# Patient Record
Sex: MALE | Race: BLACK OR AFRICAN AMERICAN | NOT HISPANIC OR LATINO | Employment: PART TIME | URBAN - METROPOLITAN AREA
[De-identification: names, ages, dates, MRNs, and addresses within clinical notes are randomized per-mention and may not be internally consistent; named-entity substitution may affect disease eponyms.]

---

## 2022-06-29 ENCOUNTER — TELEPHONE (OUTPATIENT)
Dept: HEMATOLOGY/ONCOLOGY | Facility: CLINIC | Age: 32
End: 2022-06-29
Payer: MEDICAID

## 2022-06-29 DIAGNOSIS — D3A.8 PRIMARY PANCREATIC NEUROENDOCRINE TUMOR: Primary | ICD-10-CM

## 2022-06-29 NOTE — TELEPHONE ENCOUNTER
----- Message from Jackie Bobo RN sent at 6/28/2022  5:05 PM CDT -----  Regarding: FW: schedule- message states neuro tumor  Contact: [phone]    ----- Message -----  From: Yao Love RN  Sent: 6/28/2022   3:12 PM CDT  To: [facility] Cancer Navigation  Subject: FW: schedule- message states neuro tumor           ----- Message -----  From: Vickie Nuno  Sent: 6/28/2022   3:11 PM CDT  To: , #  Subject: schedule                                         Request Appt      Appt Type: Neuro Tumor   Call Back Number:[phone]  Additional Information:

## 2022-06-29 NOTE — TELEPHONE ENCOUNTER
Called patient to discuss his desire for NP appt. Spoke to patient to get better understanding of care so far. All treatment for his neuroendocrine diagnosis has been at [facility]. Had surgery and has been on sandostatin injections for some time now with good response in symptoms. He had a negative experience yesterday at [facility] when he went for his injection and did not receive it. He is interested in transferring to [facility] asap to receive injection.     Explained that we would have to have insurance approve the injection before we can proceed, but that I can send Dr. Arellano a message to see if she can review his chart and possibly place the order in advance of a visit. Holding 7/7 appt time for patient currently at [place] location with Dr. Arellano while I wait for her response    Provided my direct number to patient, he verbalized understanding of plan of care.

## 2022-06-30 NOTE — PROGRESS NOTES
CC:  Metastatic Pancreatic neuroendocrine tumor. Grade 2 Well differentiated. Mitoses <2; Ki-67 3%; pT4, N0, M1c    HPI:  Mr. Franco is a 31M with VHL and metastatic PNET s/p distal pancreatectomy, splenectomy, left adrenalectomy, RP LN dissection, liver biopsy and cholecystectomy on 3/29/2022 by Dr Delatorre at [facility]. He was readmitted on 4/18/22 with MSSA bacteremia and discharged with home infusion of cefazolin to complete a 6 week course. He is on sandostatin monthly injections. Was previously receiving care at [facility] but wants to transfer care to us. Due for repeat imaging in July and MRI brain/ear and spine as part of the screening protocol for VHL.     Patient initially presented with abdominal pain, diarrhea and flushing since March 2021.    CT A/P 2/7/22:  Innumerable peripherally enhancing hypoattenuating lesions throughout the liver, new from the CT abdomen April 12, 2016. Additional heterogeneous mass in the left upper quadrant appears to arise from the pancreatic tail, which may be the primary source. There is loss of differentiation with the ipsilateral adrenal gland and loss of fat plane with the anterior upper pole of the left kidney. A separate 1.3 cm hyperenhancing focus is noted in the pancreatic head. Small amount of ascites present.     Biopsy of the pancreas 2/9/22:  Cytomorphology and immunochemistry is that of a pancreatic neuroendocrine tumor, well differentiated in this material and associated with fibrous tissue fragments. See note.        Note: Tumor cells are positive for CAM5.2 and diffusely for synaptophysin with appropriate controls. Ki-67 is pending for completion, and an addendum will follow; however, the liver findings on imaging indicative of probable metastatic spread are noted. Recommend clinical correlation.     He was started on sandostatin LAR 30 mg at the end of Feb 2022 which significantly helped with his symptoms.     3/8/22: chromogranin A 32.5, 5-HIAA 10    Copper PET  "3/8/22:  "The liver is enlarged with innumerable ill-defined hypoattenuating lesions seen throughout, the largest of which measures up to 4.6 cm and demonstrate increased radiotracer uptake. These lesions are compatible with metastatic disease and demonstrate a Krenning score of 4.     There is a loss of fat plane at the anterior left kidney upper pole with an ill-defined heterogeneous mass that demonstrates radiotracer uptake in the area of the left adrenal gland/pancreatic tail. These findings are compatible with fine-needle aspiration findings of pancreatic neuroendocrine tumor with likely metastatic disease to the left adrenal gland; Krenning score of 4.     The bilateral axillary lymph nodes are not enlarged by size criteria but demonstrate slight uptake of radiotracer, although less than that of the liver; Krenning score of 1.     Interval development of ascites adjacent to the inferior aspect of the liver, in the right paracolic gutter and in the pelvis.     No abnormally enlarged or radioisotope avid mediastinal, hilar, intra-abdominal, intrapelvic or inguinal lymph nodes nodes are noted to suggest metastatic disease."     He underwent distal pancreatectomy, splenectomy, left adrenalectomy, RP LN dissection, liver biopsy and cholecystectomy by Dr Delatorre on 3/29/2022.    Surgical pathology 3/29/2022:  1. Right hepatic lobe, mass biopsy:  - Metastatic well differentiated neuroendocrine tumor.      2. Gallbladder, cholecystectomy:  - Polypoidal well-differentiated neuroendocrine tumor, 0.5 cm . Cystic duct margin and serosal surface negative for tumor.      3. Pancreas, spleen and left adrenal gland, partial pancreatectomy, splenectomy, and adrenalectomy:  - Well-differentiated neuroendocrine tumor, 5.8 cm, grade 2.  Ki 67 3%. Neoplasm extends through fibroconnective tissue into the adherent adrenal gland, it abuts the spleen without involvement.  Perineural invasion identified.  Anterior and posterior margins " "positive for tumor.  Pancreatic resection margin negative ( by 3.8 mm from the tumor).  Twelve lymph nodes, negative for metastasis. (0/12). Please see comment and synoptic report     4. Soft tissue, retroperitoneum, resection:  - Well-differentiated neuroendocrine tumor extending into fibroadipose tissue and adrenal gland     5. Left adrenal gland, remainder, resection:  - Benign residual adrenal gland, and fibroadipose tissue.     6. Left hepatic lobe, mass, biopsy:  - Metastatic well-differentiated neuroendocrine tumor      MRI A 22:  "Unchanged appearance of metastatic hepatic tumor burden with target lesions as above. Interval development of a large volume of ascites. Stable 3-4 cm perianastomotic collection along the pancreatic resection margin."    Patient presents today to transfer his care. Was supposed to get sandostatin last week but didn't take it due to chaos at the infusion center. Was on oxycodone 4 a day after surgery. Main pain is in epigastric/LUQ with left back/shoulder muscle spasm from surgery. Also has pain in RUQ from cancer. Noted diarrhea, SOB, weakness. But overall slowly improving. Moving to [facility] in October.     ECO     PMH:  VHL  PNET     PSH:  distal pancreatectomy, splenectomy, left adrenalectomy, RP LN dissection, liver biopsy and cholecystectomy on 3/29/2022     Family history:  No family history of cancer    Review of Systems   Constitutional: Positive for fatigue and unexpected weight change. Negative for appetite change, chills and fever.   HENT: Negative for mouth sores, nosebleeds, tinnitus, trouble swallowing and voice change.    Eyes: Negative for pain, redness and visual disturbance.   Respiratory: Positive for shortness of breath. Negative for cough and wheezing.    Cardiovascular: Negative for chest pain, palpitations and leg swelling.   Gastrointestinal: Positive for abdominal pain and diarrhea. Negative for abdominal distention, blood in stool, " constipation, nausea and vomiting.   Endocrine: Negative for polydipsia, polyphagia and polyuria.   Genitourinary: Negative for flank pain, frequency and hematuria.   Musculoskeletal: Positive for back pain. Negative for arthralgias, gait problem, joint swelling, myalgias, neck pain and neck stiffness.   Skin: Negative for color change, pallor, rash and wound.   Neurological: Positive for weakness. Negative for tremors, seizures, syncope, speech difficulty, light-headedness, numbness and headaches.   Hematological: Negative for adenopathy. Does not bruise/bleed easily.   Psychiatric/Behavioral: Negative for confusion, dysphoric mood and self-injury. The patient is not nervous/anxious.    All other systems reviewed and are negative.      Objective:  Physical Exam  Vitals reviewed.   Constitutional:       General: He is not in acute distress.     Appearance: He is well-developed.   HENT:      Head: Normocephalic and atraumatic.      Mouth/Throat:      Pharynx: No oropharyngeal exudate.   Eyes:      General: No scleral icterus.     Conjunctiva/sclera: Conjunctivae normal.      Pupils: Pupils are equal, round, and reactive to light.   Neck:      Thyroid: No thyromegaly.      Vascular: No JVD.   Cardiovascular:      Rate and Rhythm: Normal rate and regular rhythm.      Heart sounds: Normal heart sounds. No murmur heard.    No friction rub. No gallop.   Pulmonary:      Effort: Pulmonary effort is normal. No respiratory distress.      Breath sounds: Normal breath sounds. No wheezing or rales.   Chest:   Breasts:      Right: No supraclavicular adenopathy.      Left: No supraclavicular adenopathy.       Abdominal:      General: Bowel sounds are normal. There is no distension.      Palpations: Abdomen is soft. There is no mass.      Tenderness: There is abdominal tenderness. There is no rebound.      Hernia: No hernia is present.   Musculoskeletal:         General: No tenderness or deformity. Normal range of motion.       "Cervical back: Normal range of motion and neck supple.   Lymphadenopathy:      Cervical: No cervical adenopathy.      Upper Body:      Right upper body: No supraclavicular adenopathy.      Left upper body: No supraclavicular adenopathy.   Skin:     General: Skin is warm and dry.      Coloration: Skin is not pale.      Findings: No erythema or rash.   Neurological:      Mental Status: He is alert and oriented to person, place, and time.      Cranial Nerves: No cranial nerve deficit.      Motor: No abnormal muscle tone.   Psychiatric:         Behavior: Behavior normal.         Thought Content: Thought content normal.         Judgment: Judgment normal.         Labs:  3/8/22: chromogranin A 32.5, 5-HIAA 10    Imaging Data:  Copper PET 3/8/22:  "The liver is enlarged with innumerable ill-defined hypoattenuating lesions seen throughout, the largest of which measures up to 4.6 cm and demonstrate increased radiotracer uptake. These lesions are compatible with metastatic disease and demonstrate a Krenning score of 4.     There is a loss of fat plane at the anterior left kidney upper pole with an ill-defined heterogeneous mass that demonstrates radiotracer uptake in the area of the left adrenal gland/pancreatic tail. These findings are compatible with fine-needle aspiration findings of pancreatic neuroendocrine tumor with likely metastatic disease to the left adrenal gland; Krenning score of 4.     The bilateral axillary lymph nodes are not enlarged by size criteria but demonstrate slight uptake of radiotracer, although less than that of the liver; Krenning score of 1.     Interval development of ascites adjacent to the inferior aspect of the liver, in the right paracolic gutter and in the pelvis.     No abnormally enlarged or radioisotope avid mediastinal, hilar, intra-abdominal, intrapelvic or inguinal lymph nodes nodes are noted to suggest metastatic disease."     MRI A 5/20/22:  "Unchanged appearance of metastatic hepatic " "tumor burden with target lesions as above. Interval development of a large volume of ascites. Stable 3-4 cm perianastomotic collection along the pancreatic resection margin."      Assessment and plan:    1. Neuroendocrine tumor of pancreas    2. Secondary malignant neoplasm of liver    3. Immunodeficiency secondary to neoplasm    4. Cancer-related pain    5. VHL (von Hippel-Lindau syndrome)    6. Muscle spasm    7. History of pancreatectomy      1-3  - Mr Franco is an 30 yo man with metastatic well differentiated, grade 2 pancreatic NET, with metastases to liver, intra-abdominal soft tissue, gallbladder, s/p distal pancreatectomy, splenectomy, left adrenalectomy, RP LN dissection, liver biopsy and cholecystectomy on 3/29/2022 by Dr Delatorre at [facility]  - has been on sandostatin 30 mg q4w since the end of Feb 2022. Significant symptom improvement with sandostatin. Continue every 4 weeks. Will give today  - monitor labs  - will schedule for restaging copper PET in 2 months. Will review at the NET tumor board    4.  - refilled oxycodone prn  - refer to palliative medicine for pain management    5.  - was diagnosed by blood test at [facility] after he was diagnosed with NET  - refer to genetics    6.  - from surgery  - try baclofen    7.  - glucometer/strip/lancet prescribed    Return in 4 weeks  Encouraged to call should issues arise.   I spent 70 minutes reviewing the chart, interpreting laboratory result and imaging data, coordinating patient's care, with at least 50% of the time on face-to-face counseling.       "

## 2022-07-07 ENCOUNTER — INFUSION (OUTPATIENT)
Dept: INFUSION THERAPY | Facility: HOSPITAL | Age: 32
End: 2022-07-07
Attending: INTERNAL MEDICINE
Payer: MEDICAID

## 2022-07-07 ENCOUNTER — TELEPHONE (OUTPATIENT)
Dept: INFUSION THERAPY | Facility: HOSPITAL | Age: 32
End: 2022-07-07
Payer: MEDICAID

## 2022-07-07 ENCOUNTER — OFFICE VISIT (OUTPATIENT)
Dept: HEMATOLOGY/ONCOLOGY | Facility: CLINIC | Age: 32
End: 2022-07-07
Payer: MEDICAID

## 2022-07-07 VITALS
SYSTOLIC BLOOD PRESSURE: 141 MMHG | HEIGHT: 72 IN | RESPIRATION RATE: 16 BRPM | WEIGHT: 146.38 LBS | BODY MASS INDEX: 19.83 KG/M2 | OXYGEN SATURATION: 98 % | DIASTOLIC BLOOD PRESSURE: 90 MMHG | HEART RATE: 70 BPM

## 2022-07-07 VITALS — HEIGHT: 72 IN | WEIGHT: 146.38 LBS | BODY MASS INDEX: 19.83 KG/M2

## 2022-07-07 DIAGNOSIS — M62.838 MUSCLE SPASM: ICD-10-CM

## 2022-07-07 DIAGNOSIS — G89.3 CANCER-RELATED PAIN: ICD-10-CM

## 2022-07-07 DIAGNOSIS — D3A.8 PRIMARY PANCREATIC NEUROENDOCRINE TUMOR: Primary | ICD-10-CM

## 2022-07-07 DIAGNOSIS — Q85.83 VHL (VON HIPPEL-LINDAU SYNDROME): ICD-10-CM

## 2022-07-07 DIAGNOSIS — Z90.410 HISTORY OF PANCREATECTOMY: ICD-10-CM

## 2022-07-07 DIAGNOSIS — D3A.8 NEUROENDOCRINE TUMOR OF PANCREAS: Primary | ICD-10-CM

## 2022-07-07 DIAGNOSIS — F41.9 ANXIETY: ICD-10-CM

## 2022-07-07 DIAGNOSIS — D49.9 IMMUNODEFICIENCY SECONDARY TO NEOPLASM: ICD-10-CM

## 2022-07-07 DIAGNOSIS — C78.7 SECONDARY MALIGNANT NEOPLASM OF LIVER: ICD-10-CM

## 2022-07-07 DIAGNOSIS — D84.81 IMMUNODEFICIENCY SECONDARY TO NEOPLASM: ICD-10-CM

## 2022-07-07 PROCEDURE — 99213 OFFICE O/P EST LOW 20 MIN: CPT | Mod: PBBFAC,PO | Performed by: INTERNAL MEDICINE

## 2022-07-07 PROCEDURE — 1159F MED LIST DOCD IN RCRD: CPT | Mod: CPTII,,, | Performed by: INTERNAL MEDICINE

## 2022-07-07 PROCEDURE — 63600175 PHARM REV CODE 636 W HCPCS: Mod: JG | Performed by: INTERNAL MEDICINE

## 2022-07-07 PROCEDURE — 1160F RVW MEDS BY RX/DR IN RCRD: CPT | Mod: CPTII,,, | Performed by: INTERNAL MEDICINE

## 2022-07-07 PROCEDURE — 3008F PR BODY MASS INDEX (BMI) DOCUMENTED: ICD-10-PCS | Mod: CPTII,,, | Performed by: INTERNAL MEDICINE

## 2022-07-07 PROCEDURE — 99205 OFFICE O/P NEW HI 60 MIN: CPT | Mod: S$PBB,,, | Performed by: INTERNAL MEDICINE

## 2022-07-07 PROCEDURE — 3080F PR MOST RECENT DIASTOLIC BLOOD PRESSURE >= 90 MM HG: ICD-10-PCS | Mod: CPTII,,, | Performed by: INTERNAL MEDICINE

## 2022-07-07 PROCEDURE — 3077F SYST BP >= 140 MM HG: CPT | Mod: CPTII,,, | Performed by: INTERNAL MEDICINE

## 2022-07-07 PROCEDURE — 1160F PR REVIEW ALL MEDS BY PRESCRIBER/CLIN PHARMACIST DOCUMENTED: ICD-10-PCS | Mod: CPTII,,, | Performed by: INTERNAL MEDICINE

## 2022-07-07 PROCEDURE — 99999 PR PBB SHADOW E&M-EST. PATIENT-LVL III: CPT | Mod: PBBFAC,,, | Performed by: INTERNAL MEDICINE

## 2022-07-07 PROCEDURE — 3077F PR MOST RECENT SYSTOLIC BLOOD PRESSURE >= 140 MM HG: ICD-10-PCS | Mod: CPTII,,, | Performed by: INTERNAL MEDICINE

## 2022-07-07 PROCEDURE — 99205 PR OFFICE/OUTPT VISIT, NEW, LEVL V, 60-74 MIN: ICD-10-PCS | Mod: S$PBB,,, | Performed by: INTERNAL MEDICINE

## 2022-07-07 PROCEDURE — 99999 PR PBB SHADOW E&M-EST. PATIENT-LVL III: ICD-10-PCS | Mod: PBBFAC,,, | Performed by: INTERNAL MEDICINE

## 2022-07-07 PROCEDURE — 3080F DIAST BP >= 90 MM HG: CPT | Mod: CPTII,,, | Performed by: INTERNAL MEDICINE

## 2022-07-07 PROCEDURE — 3008F BODY MASS INDEX DOCD: CPT | Mod: CPTII,,, | Performed by: INTERNAL MEDICINE

## 2022-07-07 PROCEDURE — 96372 THER/PROPH/DIAG INJ SC/IM: CPT

## 2022-07-07 PROCEDURE — 1159F PR MEDICATION LIST DOCUMENTED IN MEDICAL RECORD: ICD-10-PCS | Mod: CPTII,,, | Performed by: INTERNAL MEDICINE

## 2022-07-07 RX ORDER — OXYCODONE HYDROCHLORIDE 5 MG/1
5 TABLET ORAL EVERY 6 HOURS PRN
Qty: 120 TABLET | Refills: 0 | Status: SHIPPED | OUTPATIENT
Start: 2022-07-07 | End: 2022-08-19

## 2022-07-07 RX ORDER — METHOCARBAMOL 500 MG/1
TABLET, FILM COATED ORAL 4 TIMES DAILY
COMMUNITY
End: 2022-10-05

## 2022-07-07 RX ORDER — CETIRIZINE HYDROCHLORIDE 10 MG/1
10 TABLET ORAL DAILY
COMMUNITY

## 2022-07-07 RX ORDER — LANCETS
1 EACH MISCELLANEOUS DAILY
Qty: 100 EACH | Refills: 3 | Status: SHIPPED | OUTPATIENT
Start: 2022-07-07

## 2022-07-07 RX ORDER — ALBUTEROL SULFATE 0.63 MG/3ML
0.63 SOLUTION RESPIRATORY (INHALATION) EVERY 6 HOURS PRN
COMMUNITY

## 2022-07-07 RX ORDER — BACLOFEN 10 MG/1
10 TABLET ORAL 3 TIMES DAILY PRN
Qty: 90 TABLET | Refills: 2 | Status: SHIPPED | OUTPATIENT
Start: 2022-07-07 | End: 2023-07-07

## 2022-07-07 RX ORDER — INSULIN PUMP SYRINGE, 3 ML
EACH MISCELLANEOUS
Qty: 1 EACH | Refills: 0 | Status: SHIPPED | OUTPATIENT
Start: 2022-07-07 | End: 2023-07-07

## 2022-07-07 RX ADMIN — OCTREOTIDE ACETATE 30 MG: KIT at 10:07

## 2022-07-07 NOTE — NURSING
Patient tolerated Sandostatin 30mg injection to left dorsalgluteal well, next appointment scheduled and pt d/c in no acute distress.

## 2022-07-08 ENCOUNTER — TELEPHONE (OUTPATIENT)
Dept: HEMATOLOGY/ONCOLOGY | Facility: CLINIC | Age: 32
End: 2022-07-08
Payer: MEDICAID

## 2022-07-08 NOTE — TELEPHONE ENCOUNTER
"Spoke with pt and confirmed genetics appt with Sergio.        ----- Message from Hansa Arellano MD sent at 7/8/2022  8:20 AM CDT -----  Thanks. He lives in [facility]  ----- Message -----  From: Sergio Handley DNP  Sent: 7/7/2022   5:27 PM CDT  To: Sujata Cam LPN, Hansa Arellano MD, #    Belgican/Sujata,    Pt needs ASAP visit for VHL genetic counseling with one of us -- in-person or virtual OK if with me or Niurka, or virtual if with Marty (pt lives in [place]).    Thanks,  Sergio   ----- Message -----  From: Hansa Arellano MD  Sent: 7/7/2022  10:34 AM CDT  To: Sergio Handley DNP, Dinora Soto RN, #    Hey Sergio and Niurka,     This patient was recently diagnosed with PNET, seen by genetics at [facility] and was found out to have VHL. Was supposed to be having screening MRI of brain and ear by genetics in July, but switched care to us. Moving to [place] in October.     Any of you can see him soon? thanks  ----- Message -----  From: Dinora Soto RN  Sent: 7/7/2022  10:06 AM CDT  To: Hansa Arellano MD      ----- Message -----  From: Sylvia Gil MA  Sent: 7/7/2022   9:58 AM CDT  To: Dinora Soto RN    Hi,    Thank you for referring your patient for a cancer genetics consultation.  We have recently updated our workflow for getting patients scheduled.      To have this patient get scheduled with one of our providers (Sergio Handley NP, or JORDAN Roman), we ask that the referring provider follow the below steps:  1)  Open referral order [id number] (Ambulatory referral/consult to Genetics).  2)  Within the order, you must select a visit reason:  Select "Cancer Genetics."  3)  Once you sign the order, the referral will go into our [facility] Workqueue, and the patient will get scheduled from there.      If any of the following apply, please message Sergio Handley or Niurka Allen through [facility] directly so that your patient's visit can be expedited:  -- The genetic testing is intended to potentially be used to make " treatment decisions.  -- The patient is near end of life.  -- The patient or someone in the family has a known positive genetic testing result.  -- There is another reason you feel may necessitate expediting.    Thank you,        Cancer Genetics Team  [facility], Department of Hematology and Oncology  [facility], [facility], [facility]     ----- Message -----  From: Dinora Soto RN  Sent: 7/7/2022   9:55 AM CDT  To: [place] Staff, Tiffany CARBONE Staff    Good morning, Dr. Arellano is referring this patient for genetics. Thanks.

## 2022-07-22 ENCOUNTER — PATIENT MESSAGE (OUTPATIENT)
Dept: HEMATOLOGY/ONCOLOGY | Facility: CLINIC | Age: 32
End: 2022-07-22
Payer: MEDICAID

## 2022-07-22 ENCOUNTER — TELEPHONE (OUTPATIENT)
Dept: HEMATOLOGY/ONCOLOGY | Facility: CLINIC | Age: 32
End: 2022-07-22
Payer: MEDICAID

## 2022-07-22 NOTE — TELEPHONE ENCOUNTER
Attempted to call and confirm Monday's genetic consult, he did not answer. Left a brief message with my direct call back number.

## 2022-07-25 ENCOUNTER — TELEPHONE (OUTPATIENT)
Dept: HEMATOLOGY/ONCOLOGY | Facility: CLINIC | Age: 32
End: 2022-07-25
Payer: MEDICAID

## 2022-08-04 ENCOUNTER — INFUSION (OUTPATIENT)
Dept: INFUSION THERAPY | Facility: HOSPITAL | Age: 32
End: 2022-08-04
Attending: INTERNAL MEDICINE
Payer: MEDICAID

## 2022-08-04 ENCOUNTER — OFFICE VISIT (OUTPATIENT)
Dept: HEMATOLOGY/ONCOLOGY | Facility: CLINIC | Age: 32
End: 2022-08-04
Payer: MEDICAID

## 2022-08-04 VITALS
BODY MASS INDEX: 20.45 KG/M2 | DIASTOLIC BLOOD PRESSURE: 76 MMHG | RESPIRATION RATE: 16 BRPM | WEIGHT: 150.81 LBS | OXYGEN SATURATION: 100 % | HEART RATE: 73 BPM | SYSTOLIC BLOOD PRESSURE: 124 MMHG

## 2022-08-04 VITALS — HEIGHT: 72 IN | BODY MASS INDEX: 19.83 KG/M2 | WEIGHT: 146.38 LBS

## 2022-08-04 DIAGNOSIS — D3A.8 NEUROENDOCRINE TUMOR OF PANCREAS: Primary | ICD-10-CM

## 2022-08-04 DIAGNOSIS — M62.838 MUSCLE SPASM: ICD-10-CM

## 2022-08-04 DIAGNOSIS — C78.7 SECONDARY MALIGNANT NEOPLASM OF LIVER: ICD-10-CM

## 2022-08-04 DIAGNOSIS — Z90.410 HISTORY OF PANCREATECTOMY: ICD-10-CM

## 2022-08-04 DIAGNOSIS — G89.3 CANCER-RELATED PAIN: ICD-10-CM

## 2022-08-04 DIAGNOSIS — D49.9 IMMUNODEFICIENCY SECONDARY TO NEOPLASM: ICD-10-CM

## 2022-08-04 DIAGNOSIS — D3A.8 PRIMARY PANCREATIC NEUROENDOCRINE TUMOR: Primary | ICD-10-CM

## 2022-08-04 DIAGNOSIS — D84.81 IMMUNODEFICIENCY SECONDARY TO NEOPLASM: ICD-10-CM

## 2022-08-04 DIAGNOSIS — Q85.83 VHL (VON HIPPEL-LINDAU SYNDROME): ICD-10-CM

## 2022-08-04 PROCEDURE — 3074F PR MOST RECENT SYSTOLIC BLOOD PRESSURE < 130 MM HG: ICD-10-PCS | Mod: CPTII,,, | Performed by: PHYSICIAN ASSISTANT

## 2022-08-04 PROCEDURE — 99215 OFFICE O/P EST HI 40 MIN: CPT | Mod: S$PBB,,, | Performed by: PHYSICIAN ASSISTANT

## 2022-08-04 PROCEDURE — 3078F PR MOST RECENT DIASTOLIC BLOOD PRESSURE < 80 MM HG: ICD-10-PCS | Mod: CPTII,,, | Performed by: PHYSICIAN ASSISTANT

## 2022-08-04 PROCEDURE — 96372 THER/PROPH/DIAG INJ SC/IM: CPT

## 2022-08-04 PROCEDURE — 99999 PR PBB SHADOW E&M-EST. PATIENT-LVL III: ICD-10-PCS | Mod: PBBFAC,,, | Performed by: PHYSICIAN ASSISTANT

## 2022-08-04 PROCEDURE — 3008F BODY MASS INDEX DOCD: CPT | Mod: CPTII,,, | Performed by: PHYSICIAN ASSISTANT

## 2022-08-04 PROCEDURE — 1159F MED LIST DOCD IN RCRD: CPT | Mod: CPTII,,, | Performed by: PHYSICIAN ASSISTANT

## 2022-08-04 PROCEDURE — 3008F PR BODY MASS INDEX (BMI) DOCUMENTED: ICD-10-PCS | Mod: CPTII,,, | Performed by: PHYSICIAN ASSISTANT

## 2022-08-04 PROCEDURE — 63600175 PHARM REV CODE 636 W HCPCS: Mod: JG | Performed by: INTERNAL MEDICINE

## 2022-08-04 PROCEDURE — 99215 PR OFFICE/OUTPT VISIT, EST, LEVL V, 40-54 MIN: ICD-10-PCS | Mod: S$PBB,,, | Performed by: PHYSICIAN ASSISTANT

## 2022-08-04 PROCEDURE — 1159F PR MEDICATION LIST DOCUMENTED IN MEDICAL RECORD: ICD-10-PCS | Mod: CPTII,,, | Performed by: PHYSICIAN ASSISTANT

## 2022-08-04 PROCEDURE — 1160F PR REVIEW ALL MEDS BY PRESCRIBER/CLIN PHARMACIST DOCUMENTED: ICD-10-PCS | Mod: CPTII,,, | Performed by: PHYSICIAN ASSISTANT

## 2022-08-04 PROCEDURE — 99999 PR PBB SHADOW E&M-EST. PATIENT-LVL III: CPT | Mod: PBBFAC,,, | Performed by: PHYSICIAN ASSISTANT

## 2022-08-04 PROCEDURE — 3078F DIAST BP <80 MM HG: CPT | Mod: CPTII,,, | Performed by: PHYSICIAN ASSISTANT

## 2022-08-04 PROCEDURE — 3074F SYST BP LT 130 MM HG: CPT | Mod: CPTII,,, | Performed by: PHYSICIAN ASSISTANT

## 2022-08-04 PROCEDURE — 1160F RVW MEDS BY RX/DR IN RCRD: CPT | Mod: CPTII,,, | Performed by: PHYSICIAN ASSISTANT

## 2022-08-04 PROCEDURE — 99213 OFFICE O/P EST LOW 20 MIN: CPT | Mod: PBBFAC,PO | Performed by: PHYSICIAN ASSISTANT

## 2022-08-04 RX ADMIN — OCTREOTIDE ACETATE 30 MG: KIT at 09:08

## 2022-08-04 NOTE — NURSING
Patient tolerated Sandostatin injection well, next appointment scheduled and pt d/c in no acute distress.

## 2022-08-04 NOTE — PROGRESS NOTES
CC:  Metastatic Pancreatic neuroendocrine tumor. Grade 2 Well differentiated. Mitoses <2; Ki-67 3%; pT4, N0, M1c    Oncological History copied from medical chart:     Mr. Franco is a 31M with VHL and metastatic PNET s/p distal pancreatectomy, splenectomy, left adrenalectomy, RP LN dissection, liver biopsy and cholecystectomy on 3/29/2022 by Dr Delatorre [place] [facility] [place] was readmitted on 4/18/22 with MSSA bacteremia and discharged with home infusion of cefazolin to complete a 6 week course. He is on sandostatin monthly injections. Was previously receiving [place] at [facility] [place] but wants to [place] care to us. Due for repeat imaging in July and MRI brain/ear and spine as part of the screening protocol for VHL.     Patient initially presented with abdominal pain, diarrhea and flushing since March 2021.    CT A/P 2/7/22:  Innumerable peripherally enhancing hypoattenuating lesions throughout the liver, new from the CT abdomen April 12, 2016. Additional heterogeneous mass in the left upper quadrant appears to arise from the pancreatic tail, which may be the primary source. There is loss of differentiation with the ipsilateral adrenal gland and loss of fat plane with the anterior upper pole of the left kidney. A separate 1.3 cm hyperenhancing focus is noted in the pancreatic head. Small amount of ascites present.     Biopsy of the pancreas 2/9/22:  Cytomorphology and immunochemistry is that of a pancreatic neuroendocrine tumor, well differentiated in this material and associated with fibrous tissue fragments. See note.        Note: Tumor cells are positive for CAM5.2 and diffusely for synaptophysin with appropriate controls. Ki-67 is pending for completion, and an addendum will follow; however, the liver findings on imaging indicative of probable metastatic spread are noted. Recommend clinical correlation.     He was started on sandostatin LAR 30 mg at the end of Feb 2022 which significantly helped with his symptoms.     3/8/22:  "chromogranin A 32.5, 5-HIAA 10    Copper PET 3/8/22:  "The liver is enlarged with innumerable ill-defined hypoattenuating lesions seen throughout, the largest of which measures up to 4.6 cm and demonstrate increased radiotracer uptake. These lesions are compatible with metastatic disease and demonstrate a Krenning score of 4.     There is a loss of fat plane at the anterior left kidney upper pole with an ill-defined heterogeneous mass that demonstrates radiotracer uptake in the area of the left adrenal gland/pancreatic tail. These findings are compatible with fine-needle aspiration findings of pancreatic neuroendocrine tumor with likely metastatic disease to the left adrenal gland; Krenning score of 4.     The bilateral axillary lymph nodes are not enlarged by size criteria but demonstrate slight uptake of radiotracer, although less than that of the liver; Krenning score of 1.     Interval development of ascites adjacent to the inferior aspect of the liver, in the right paracolic gutter and in the pelvis.     No abnormally enlarged or radioisotope avid mediastinal, hilar, intra-abdominal, intrapelvic or inguinal lymph nodes nodes are noted to suggest metastatic disease."     He underwent distal pancreatectomy, splenectomy, left adrenalectomy, RP LN dissection, liver biopsy and cholecystectomy by Dr Delatorre on 3/29/2022.    Surgical pathology 3/29/2022:  1. Right hepatic lobe, mass biopsy:  - Metastatic well differentiated neuroendocrine tumor.      2. Gallbladder, cholecystectomy:  - Polypoidal well-differentiated neuroendocrine tumor, 0.5 cm . Cystic duct margin and serosal surface negative for tumor.      3. Pancreas, spleen and left adrenal gland, partial pancreatectomy, splenectomy, and adrenalectomy:  - Well-differentiated neuroendocrine tumor, 5.8 cm, grade 2.  Ki 67 3%. Neoplasm extends through fibroconnective tissue into the adherent adrenal gland, it abuts the spleen without involvement.  Perineural invasion " "identified.  Anterior and posterior margins positive for tumor.  Pancreatic resection margin negative ( by 3.8 mm from the tumor).  Twelve lymph nodes, negative for metastasis. (0/12). Please see comment and synoptic report     4. Soft tissue, retroperitoneum, resection:  - Well-differentiated neuroendocrine tumor extending into fibroadipose tissue and adrenal gland     5. Left adrenal gland, remainder, resection:  - Benign residual adrenal gland, and fibroadipose tissue.     6. Left hepatic lobe, mass, biopsy:  - Metastatic well-differentiated neuroendocrine tumor      MRI A 22:  "Unchanged appearance of metastatic hepatic tumor burden with target lesions as above. Interval development of a large volume of ascites. Stable 3-4 cm perianastomotic collection along the pancreatic resection margin."    Interval History: Patient presents today in f/u to continue treatment for metastatic PNET. He is tolerating the monthly Sandostatin well thus far. His symptoms of diarrhea, weakness improve with the injection. His pain that has been present after surgery and from the cancer are also fairly controlled with oxycodone.  Main pain is in epigastric/LUQ with left back/shoulder muscle spasm from surgery. This has improved with the start of the Baclofen as well. Pain from the cancer is more so in the RUQ. His pain also occurs after eating, which decreases his appetite but he does eat. Noted diarrhea, SOB, weakness. But overall slowly improving. Moving to [facility] in October.     ECO. Presents alone today.     PMH:  VHL  PNET     PSH:  distal pancreatectomy, splenectomy, left adrenalectomy, RP LN dissection, liver biopsy and cholecystectomy on 3/29/2022     Family history:  No family history of cancer    Review of Systems   Constitutional: Positive for fatigue and unexpected weight change. Negative for appetite change, chills and fever.   HENT: Negative for mouth sores, nosebleeds, tinnitus, trouble " swallowing and voice change.    Eyes: Negative for pain, redness and visual disturbance.   Respiratory: Positive for shortness of breath. Negative for cough and wheezing.    Cardiovascular: Negative for chest pain, palpitations and leg swelling.   Gastrointestinal: Positive for abdominal pain and diarrhea. Negative for abdominal distention, blood in stool, constipation, nausea and vomiting.   Endocrine: Negative for polydipsia, polyphagia and polyuria.   Genitourinary: Negative for flank pain, frequency and hematuria.   Musculoskeletal: Positive for back pain. Negative for arthralgias, gait problem, joint swelling, myalgias, neck pain and neck stiffness.   Skin: Negative for color change, pallor, rash and wound.   Neurological: Positive for weakness. Negative for tremors, seizures, syncope, speech difficulty, light-headedness, numbness and headaches.   Hematological: Negative for adenopathy. Does not bruise/bleed easily.   Psychiatric/Behavioral: Negative for confusion, dysphoric mood and self-injury. The patient is not nervous/anxious.    All other systems reviewed and are negative.      Objective:  Physical Exam  Vitals reviewed.   Constitutional:       General: He is not in acute distress.     Appearance: He is well-developed.   HENT:      Head: Normocephalic and atraumatic.      Mouth/Throat:      Pharynx: No oropharyngeal exudate.   Eyes:      General: No scleral icterus.     Conjunctiva/sclera: Conjunctivae normal.      Pupils: Pupils are equal, round, and reactive to light.   Neck:      Thyroid: No thyromegaly.      Vascular: No JVD.   Cardiovascular:      Rate and Rhythm: Normal rate and regular rhythm.      Heart sounds: Normal heart sounds. No murmur heard.    No friction rub. No gallop.   Pulmonary:      Effort: Pulmonary effort is normal. No respiratory distress.      Breath sounds: Normal breath sounds. No wheezing or rales.   Chest:   Breasts:      Right: No supraclavicular adenopathy.      Left: No  "supraclavicular adenopathy.       Abdominal:      General: Bowel sounds are normal. There is no distension.      Palpations: Abdomen is soft. There is no mass.      Tenderness: There is abdominal tenderness. There is no rebound.      Hernia: No hernia is present.   Musculoskeletal:         General: No tenderness or deformity. Normal range of motion.      Cervical back: Normal range of motion and neck supple.   Lymphadenopathy:      Cervical: No cervical adenopathy.      Upper Body:      Right upper body: No supraclavicular adenopathy.      Left upper body: No supraclavicular adenopathy.   Skin:     General: Skin is warm and dry.      Coloration: Skin is not pale.      Findings: No erythema or rash.   Neurological:      Mental Status: He is alert and oriented to person, place, and time.      Cranial Nerves: No cranial nerve deficit.      Motor: No abnormal muscle tone.   Psychiatric:         Behavior: Behavior normal.         Thought Content: Thought content normal.         Judgment: Judgment normal.         Labs:  3/8/22: chromogranin A 32.5, 5-HIAA 10    Imaging Data:  Copper PET 3/8/22:  "The liver is enlarged with innumerable ill-defined hypoattenuating lesions seen throughout, the largest of which measures up to 4.6 cm and demonstrate increased radiotracer uptake. These lesions are compatible with metastatic disease and demonstrate a Krenning score of 4.     There is a loss of fat plane at the anterior left kidney upper pole with an ill-defined heterogeneous mass that demonstrates radiotracer uptake in the area of the left adrenal gland/pancreatic tail. These findings are compatible with fine-needle aspiration findings of pancreatic neuroendocrine tumor with likely metastatic disease to the left adrenal gland; Krenning score of 4.     The bilateral axillary lymph nodes are not enlarged by size criteria but demonstrate slight uptake of radiotracer, although less than that of the liver; Krenning score of 1. " "    Interval development of ascites adjacent to the inferior aspect of the liver, in the right paracolic gutter and in the pelvis.     No abnormally enlarged or radioisotope avid mediastinal, hilar, intra-abdominal, intrapelvic or inguinal lymph nodes nodes are noted to suggest metastatic disease."     MRI A 5/20/22:  "Unchanged appearance of metastatic hepatic tumor burden with target lesions as above. Interval development of a large volume of ascites. Stable 3-4 cm perianastomotic collection along the pancreatic resection margin."      Assessment and plan:    1. Neuroendocrine tumor of pancreas    2. Secondary malignant neoplasm of liver    3. Immunodeficiency secondary to neoplasm    4. Cancer-related pain    5. VHL (von Hippel-Lindau syndrome)    6. Muscle spasm    7. History of pancreatectomy      1-3  - Mr Franco is an 30 yo man with metastatic well differentiated, grade 2 pancreatic NET, with metastases to liver, intra-abdominal soft tissue, gallbladder, s/p distal pancreatectomy, splenectomy, left adrenalectomy, RP LN dissection, liver biopsy and cholecystectomy on 3/29/2022 by Dr Delatorre at [facility] [place]  - has been on sandostatin 30 mg q4w since the end of Feb 2022. Significant symptom improvement with sandostatin. Continue every 4 weeks. Will give today  - monitor labs  - will schedule for restaging copper PET in 1 month. Will review at the NET tumor board after his scan    4.  - Controlled. C/w oxycodone prn  - Has an appointment with Dr. Cormier on 8/17/2022    5.  - was diagnosed by blood test at [facility] after he was diagnosed with NET  - refer to genetics    6.  - from surgery  - Improved with the Baclofen. C/w medication    7.  - glucometer/strip/lancet prescribed and received  - doing well    Return in 4 weeks with lab work and repeat imaging studies    Pte and family members displayed understanding of the above encounter and treatment plan. All thoughtful questions were answered to their satisfaction. Pte was " advised to notify the care team or proceed to the ER if signs and symptoms worsen.       KEISHA Martinez, PA-C  Physician Assistant Certified  Dept of Hematology/Oncology  PA-C to Dr. Arellano, Dr. Collier and Dr. Ga    Route Chart for Scheduling    Med Onc Chart Routing      Follow up with physician 4 weeks. Radha patient in NET clinic. RTC in 4 weeks    Follow up with [facility]    Infusion scheduling note    Injection scheduling note    Labs    Imaging    Pharmacy appointment    Other referrals            Therapy Plan Information  Medications  octreotide (SANDOSTATIN LAR) injection 30 mg  30 mg, Intramuscular, Every 4 weeks

## 2022-08-12 ENCOUNTER — PATIENT MESSAGE (OUTPATIENT)
Dept: HEMATOLOGY/ONCOLOGY | Facility: CLINIC | Age: 32
End: 2022-08-12
Payer: MEDICAID

## 2022-08-16 ENCOUNTER — TELEPHONE (OUTPATIENT)
Dept: PALLIATIVE MEDICINE | Facility: CLINIC | Age: 32
End: 2022-08-16
Payer: MEDICAID

## 2022-08-17 NOTE — PROGRESS NOTES
"Department of Hematology and Oncology  [facility]    Cancer Genetics  Initial Consult    Date of Service:  22  Visit Provider:  Niurka Allen PA-C  Collaborating Physician:  Tena Asher MD    Patient:  Kwame Franco  :  1990  MRN:  [id number]     Referring Provider    Hansa Arellano MD  [address] [place] [address]    SUBJECTIVE      Chief Complaint: Genetic evaluation  History of Present Illness (HPI):  Kwame Franco ("Kwame"), 31 y.o., assigned male sex at birth, is established with the [facility] of [facility] but new to me. 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 Kwame saw Donna Butler, MGC, [facility] on 5/3/2022 at [facility] for post-test genetic counseling and discussed the cancer risks, management recommendations, inheritance patterns and implications for relatives. The plan was to initiate screening through [facility] but he opted to transfer his care to [facility]. I am seeing him today to check his understanding of VHL, discuss testing of relatives, and arrange for VHL screening. Screening will need to be done promptly as he will be moving to [place] in October to be closer to his sister.     [facility] Multi-Cancer panel,   · Genes analyzed: AIP, ALK, APC, ISABELLA, AXIN2, BAP1, BARD1, BLM, BMPR1A, BRCA1, BRCA2, BRIP1, CASR, CDC73, CDH1, CDK4, CDKN1B, CDKN1C, CDKN2A, CEBPA, CHEK2, CTNNA1, DICER1, DIS3L2, EGFR, EPCAM, FH, FLCN, GATA2, GPC3, GREM1, HOXB13, HRAS, KIT, MAX, MEN1, MET, MITF, MLH1, MSH2, MSH3, MSH6, MUTYH, NBN, NF1, NF2, NTHL1, PALB2, PDGFRA, PHOX2B, PMS2, POLD1, POLE, POT, IUQVX7U, PTCH1, PTEN, RAD50, RAD51C, RAD51D, RB1, RECQL4, RET, RUNX1, SDHA, SDHAF2, SDHB, SDHC, SDHD, SMAD4, SMARCA4, SMARCB1, SMARCE1, STK11, SUFU, TERC, TERT, " [id number], TP53, TSC1, TSC2, VHL, WRN, WT1  · Result: VHL c.482G>A, heterozygous, pathogenic    Genetic Assessment History   Germline cancer-genetic testing: Yes   Personal history of cancer: Yes, metastatic PNET (2022, age 31)   Benign tumors: No   Colon polyps: No history of colonoscopy   Pancreatitis: No   Chemoprevention: Never used   Ashkenazi Shinto ancestry: No     Past Medical History:   Diagnosis Date    Primary pancreatic neuroendocrine tumor     VHL (von Hippel-Lindau syndrome)      Patient Active Problem List    Diagnosis Date Noted    VHL (von Hippel-Lindau syndrome) 08/18/2022    Secondary malignant neoplasm of liver 07/07/2022    Primary pancreatic neuroendocrine tumor 06/29/2022      Family History   Germline cancer-genetic testing in blood relatives:  No   Consanguinity in ancestors:  No   No known history of cancer of colorectal polyps in extended family other than noted below.       History reviewed. No pertinent family history.   Review of Systems   See HPI.     Pain 6/10   Recent falls: None    Patient's Distress Score today was 6/10 (with 10 being the worst). Related to pain and his diagnosis.     OBJECTIVE      Physical Exam  Very pleasant patient.  Unaccompanied  Vitals signs:  There were no vitals filed for this visit.   Constitutional      Appearance:  Well developed and well nourished. No apparent distress.   Pulmonary     Effort:  Normal  Neurological     Mental Status:  Alert and oriented.     Coordination:  Normal  Psychiatric        Mood and Affect: Normal     Thought Content:  Normal       Speech:  Normal     Behavior:  Normal     Judgment:  Normal  Genetics-specific     It is my assessment that the patient is ready to proceed with cancer-genetic testing from a psychosocial perspective.    COUNSELING      Germline VHL Gene Mutations     About Von Hippel-Lindau syndrome (VHL)   o The VHL gene is a tumor suppressor gene. That means its job is to prevent tumors/cancer.    o Von Hippel-Lindau syndrome (VHL) occurs when someone is born with a harmful variation/mutation in their VHL gene that shuts down the function. Since the gene can't do its job, the person is at increased risk for tumors/cancer. These tumors can begin in infancy and childhood but usually begin in adolescence and early adulthood. There are several subtypes of VHL and each has a different level of risk for each tumor type. They all, however, follow the same recommendations for screening and management.  o VHL is an autosomal dominant genetic condition, which means only one altered copy of the gene is needed to have the condition.   - 80% of the time the individual inherits the mutated VHL gene from one parent.   - 20% of the time the individual has a de meenu mutation, which means they are the first person in the family to have the VHL gene mutation.   Hemangioblastomas of the brain and spinal cord   o Hemangioblastomas are benign tumors that form in the blood vessels of the brain and spinal cord. While they are not cancer, they can grow and press on surrounding structures causing serious symptoms.   o Symptoms of brain tumors: Balance problems, headaches, nausea and vomiting.   o Symptoms of spine tumors: Unable to defecate/urinate, fecal/urine incontinence, muscle weakness, numbness and tingling, pain.   o Screening: MRI Brain and Spine w/wo contrast every 2 years beginning at age 11. Include thin cuts through the posterior fossa and petrous temple bone.    Hemangioblastomas of the retina (retinal angiomas)  o These benign tumors form in the blood vessels inside the eye.   o Symptoms: Eye pain, eye swelling, retinal detachment.  o Screening: Dilated retinal exam every 6-12 months beginning at birth to age 30, then annually   Clear cell renal cell carcinoma (RCC)   o Renal cell carcinoma is a type of kidney cancer.   o Individuals with VHL often have multiple kidney cysts that are benign, but cancer can grow  inside of them.   o Symptoms: Blood in the urine, low back pain on one side, unexplained weight loss, fatigue.   o Screening: MRI Abdomen w/wo contrast every 2 years beginning at age 15   o See NCCN guidelines for further details regarding treatment of RCC associated with VHL   Serous cystadenomas and neuroendocrine tumors of the pancreas (pNETs)   o Serous cystadenomas are benign cystic tumors that form in the pancreas.   o Pancreas neuroendocrine tumors (pNETs) can be benign (not cancer) or malignant (cancer) and spread to other places such as the lymph nodes and liver.   o Symptoms: Upper abdominal pain, unexplained weight loss, nausea and vomiting, bloating, cramping, increased flatulence, loose, greasy, foul-smelling stools.   o Screening: MRI Abdomen w/wo contrast every 2 years beginning at age 15    Pheochromocytomas   o Pheochromocytoma is a benign tumor of the adrenal gland, which is located on top of the kidney.   o Symptoms: High blood pressure, rapid heart beat, sweating, mood changes.   o Screening:   - MRI Abdomen w/wo contrast every 2 years beginning at age 15   - Blood pressure and pulse monitoring at all medical visits (at least annually) beginning at age 2  - Plasma-free metanephrines (preferred) or 24-hour urine test for fractionated metanephreines annually beginning at age 5   Endolymphatic sac tumors (ELST) of the inner ear   o ELSTs are benign tumors that form in the inner ear.   o Symptoms: Hearing loss, tinnitus, vertigo, facial muscle weakness.   o Screening:   - One time MRI of the internal auditory canal at age 15   - Audiogram every 2 years beginning at age 11   Papillary cystadenomas of the epididymis and broad ligament  o Papillary cystadenomas are benign tumors found in the epididymis (part of the testicle) and broad ligament (a ligament that holds the uterus and ovaries in place).   o Symptoms: Usually no symptoms, but can have pain and heavy menstrual periods.    Screening  summary with start age:   o Birth: Dilated eye exam every 6-12 months beginning at birth to age 30, then annually  o Age 1: Annual physical exam with a healthcare provider.   o Age 2: Blood pressure and pulse monitoring at all medical visits (at least annually)   o Age 5: Plasma-free metanephrines (preferred) or 24-hour urine test for fractionated metanephreines annually   o Age 11: Audiogram every 2 years   o Age 11: MRI Brain and Spine w/wo contrast every 2 years   o Age 15: MRI Abdomen w/wo contrast every 2 years (one MRI to assess kidneys, pancreas, and adrenal glands)  o Age 65: If no tumors have been identified, the only screening they need is an annual physical exam with blood pressure and pulse and dilated retina (eye) exam.   o Special note: Pregnant women with a history of tumors may be at increased risk for tumor development during pregnancy and should discuss screening recommendations with their physician.    Implications for relatives:   o When an individual has a genetic mutation, each child and sibling has a 50% chance of also having the mutation. All children and siblings should have genetic testing to determine if they have VHL syndrome. Children should be tested for VHL as early as birth.   o Both parents should be tested to determine if they have VHL. If both test negative, the individual likely has a de meenu mutation. If one tests positive, relatives on that side of the family (aunts, uncles, cousins, etc) should have testing to determine if they also have VHL. Mutations don't skip generations so if someone tests negative, their children will also be negative.    Reproductive Implications:   o If both reproductive partners have a VHL mutation, each child has a 50% chance of inheriting one mutated VHL gene and a 25% chance of inheriting both mutated genes. An individual with two VHL mutations can have familial erythrocytosis type 2 (ECYT2), a rare condition characterized by an excess  erythrocytes (red blood cells) and erythropoietin resulting in high risk for blood clots and cerebrovascular events (stroke, etc).   o Anyone who has or may have a VHL mutation and is planning to have children should seek preconception genetic counseling for discussion regarding genetic testing and assisted reproduction through IVF and preimplanation genetic diagnosis (PGD).     References:   1. National Comprehensive Cancer Network (NCCN). (2022). Neuroendocrine and adrenal tumors. NCCN Clinical Practice Guidelines in Oncology (NCCN Guidelines), Version 1.2022.  2. MARIO Erazo, & KELLY Larkin (2022). Clinical features, diagnosis, and management of von Hippel-Lindau disease. In MARIO Lamar (Ed.), [employer]. Retrieved August 22, 2022, from https://www.Vibease/contents/clinical-features-diagnosis-and-management-of-von-hippel-lindau-disease?[email or web address]=9539&source=see_link#H27  3. VHLA Suggested Active Surveillance Guidelines 2020. Retrieved August 22, 2022, from https://www.vhl.org/wp-content/uploads/2020/10/Active-Surveillance-Guidelines-2020.pdf    Germline cancer-genetic testing is the testing of genes associated with cancer, known as cancer susceptibility genes.  Just as these genes are inherited from parents, mutations in these genes can be inherited, as well.  A mutation in a cancer susceptibility gene adversely affects the gene's ability to prevent cancer; therefore, carriers of cancer susceptibility gene mutations may be at increased risk for certain cancers.    A small percentage of cancers are caused by an cancer susceptibility gene mutation, meaning the cancer is genetic/hereditary; rather, most cancers are sporadic.  Causes of sporadic cancers may include environmental risk factors, lifestyle risk factors, and non-modifiable risk factors.  It is important to note that members of a family often share not only their genetics but also risk factors including environmental and lifestyle risk factors.     Questions  were encouraged and answered to the patient's satisfaction, and he verbalized understanding of information and agreement with the plan.       ASSESSMENT/PLAN   Kwame Franco was recently diagnosed with a metastatic neuroendocrine tumor of the pancreas and tested positive for a VHL gene mutation consistent with von Hippel-Lindau syndrome.      1. VHL (von Hippel-Lindau syndrome)  - Ambulatory referral/consult to Genetics  - MRI Brain W WO Contrast; Future  - MRI Cervical Spine W WO Cont; Future  - MRI Thoracic Spine W WO Contrast; Future  - MRI Lumbar Spine W WO Contrast; Future  - Ambulatory referral/consult to Endocrine Surgery; Future  - Ambulatory referral/consult to Audiology; Future  - Ambulatory referral/consult to Ophthalmology; Future  - MRI Abdomen - Was done on 5/20/2022. Next screening MRI abdomen is due in 2 years (5/2024).     Follow-up:  Follow up for results review.     Approximately 30 minutes were spent face-to-face with the patient.  Approximately 120 minutes in total were spent on this encounter, which includes face-to-face time and non-face-to-face time preparing to see the patient (e.g., review of tests), obtaining and/or reviewing separately obtained history, documenting clinical information in the electronic or other health record, independently interpreting results (not separately reported) and communicating results to the patient/family/caregiver, or care coordination (not separately reported).     Niurka Allen PA-C, MPAS, PA-C  Physician Assistant, Hereditary/High Risk Clinic  Hematology/Oncology, [facility]

## 2022-08-18 ENCOUNTER — TELEPHONE (OUTPATIENT)
Dept: PALLIATIVE MEDICINE | Facility: CLINIC | Age: 32
End: 2022-08-18
Payer: MEDICAID

## 2022-08-18 ENCOUNTER — OFFICE VISIT (OUTPATIENT)
Dept: HEMATOLOGY/ONCOLOGY | Facility: CLINIC | Age: 32
End: 2022-08-18
Payer: MEDICAID

## 2022-08-18 DIAGNOSIS — Q85.83 VHL (VON HIPPEL-LINDAU SYNDROME): ICD-10-CM

## 2022-08-18 PROCEDURE — 99214 OFFICE O/P EST MOD 30 MIN: CPT | Mod: PBBFAC | Performed by: PHYSICIAN ASSISTANT

## 2022-08-18 PROCEDURE — 99417 PROLNG OP E/M EACH 15 MIN: CPT | Mod: S$PBB,,, | Performed by: PHYSICIAN ASSISTANT

## 2022-08-18 PROCEDURE — 99417 PR PROLONGED SVC, OUTPT, W/WO DIRECT PT CONTACT,  EA ADDTL 15 MIN: ICD-10-PCS | Mod: S$PBB,,, | Performed by: PHYSICIAN ASSISTANT

## 2022-08-18 PROCEDURE — 99999 PR PBB SHADOW E&M-EST. PATIENT-LVL IV: ICD-10-PCS | Mod: PBBFAC,,, | Performed by: PHYSICIAN ASSISTANT

## 2022-08-18 PROCEDURE — 99999 PR PBB SHADOW E&M-EST. PATIENT-LVL IV: CPT | Mod: PBBFAC,,, | Performed by: PHYSICIAN ASSISTANT

## 2022-08-18 PROCEDURE — 99215 OFFICE O/P EST HI 40 MIN: CPT | Mod: S$PBB,,, | Performed by: PHYSICIAN ASSISTANT

## 2022-08-18 PROCEDURE — 99215 PR OFFICE/OUTPT VISIT, EST, LEVL V, 40-54 MIN: ICD-10-PCS | Mod: S$PBB,,, | Performed by: PHYSICIAN ASSISTANT

## 2022-08-19 ENCOUNTER — OFFICE VISIT (OUTPATIENT)
Dept: PALLIATIVE MEDICINE | Facility: CLINIC | Age: 32
End: 2022-08-19
Payer: MEDICAID

## 2022-08-19 VITALS — SYSTOLIC BLOOD PRESSURE: 156 MMHG | HEART RATE: 76 BPM | DIASTOLIC BLOOD PRESSURE: 104 MMHG

## 2022-08-19 DIAGNOSIS — Z71.89 ADVANCED CARE PLANNING/COUNSELING DISCUSSION: ICD-10-CM

## 2022-08-19 DIAGNOSIS — G89.3 CANCER-RELATED PAIN: ICD-10-CM

## 2022-08-19 DIAGNOSIS — Z51.5 ENCOUNTER FOR PALLIATIVE CARE: ICD-10-CM

## 2022-08-19 DIAGNOSIS — R63.0 ANOREXIA: ICD-10-CM

## 2022-08-19 DIAGNOSIS — R06.00 DYSPNEA, UNSPECIFIED TYPE: ICD-10-CM

## 2022-08-19 DIAGNOSIS — F43.23 ADJUSTMENT DISORDER WITH MIXED ANXIETY AND DEPRESSED MOOD: ICD-10-CM

## 2022-08-19 DIAGNOSIS — G47.00 INSOMNIA, UNSPECIFIED TYPE: ICD-10-CM

## 2022-08-19 DIAGNOSIS — D3A.8 NEUROENDOCRINE TUMOR OF PANCREAS: Primary | ICD-10-CM

## 2022-08-19 DIAGNOSIS — R53.0 NEOPLASTIC (MALIGNANT) RELATED FATIGUE: ICD-10-CM

## 2022-08-19 DIAGNOSIS — C78.7 SECONDARY MALIGNANT NEOPLASM OF LIVER: ICD-10-CM

## 2022-08-19 PROCEDURE — 99999 PR PBB SHADOW E&M-EST. PATIENT-LVL III: CPT | Mod: PBBFAC,,, | Performed by: STUDENT IN AN ORGANIZED HEALTH CARE EDUCATION/TRAINING PROGRAM

## 2022-08-19 PROCEDURE — 3077F PR MOST RECENT SYSTOLIC BLOOD PRESSURE >= 140 MM HG: ICD-10-PCS | Mod: CPTII,,, | Performed by: STUDENT IN AN ORGANIZED HEALTH CARE EDUCATION/TRAINING PROGRAM

## 2022-08-19 PROCEDURE — 3080F DIAST BP >= 90 MM HG: CPT | Mod: CPTII,,, | Performed by: STUDENT IN AN ORGANIZED HEALTH CARE EDUCATION/TRAINING PROGRAM

## 2022-08-19 PROCEDURE — 3080F PR MOST RECENT DIASTOLIC BLOOD PRESSURE >= 90 MM HG: ICD-10-PCS | Mod: CPTII,,, | Performed by: STUDENT IN AN ORGANIZED HEALTH CARE EDUCATION/TRAINING PROGRAM

## 2022-08-19 PROCEDURE — 1160F RVW MEDS BY RX/DR IN RCRD: CPT | Mod: CPTII,,, | Performed by: STUDENT IN AN ORGANIZED HEALTH CARE EDUCATION/TRAINING PROGRAM

## 2022-08-19 PROCEDURE — 99999 PR PBB SHADOW E&M-EST. PATIENT-LVL III: ICD-10-PCS | Mod: PBBFAC,,, | Performed by: STUDENT IN AN ORGANIZED HEALTH CARE EDUCATION/TRAINING PROGRAM

## 2022-08-19 PROCEDURE — 99205 PR OFFICE/OUTPT VISIT, NEW, LEVL V, 60-74 MIN: ICD-10-PCS | Mod: S$PBB,,, | Performed by: STUDENT IN AN ORGANIZED HEALTH CARE EDUCATION/TRAINING PROGRAM

## 2022-08-19 PROCEDURE — 1160F PR REVIEW ALL MEDS BY PRESCRIBER/CLIN PHARMACIST DOCUMENTED: ICD-10-PCS | Mod: CPTII,,, | Performed by: STUDENT IN AN ORGANIZED HEALTH CARE EDUCATION/TRAINING PROGRAM

## 2022-08-19 PROCEDURE — 1159F PR MEDICATION LIST DOCUMENTED IN MEDICAL RECORD: ICD-10-PCS | Mod: CPTII,,, | Performed by: STUDENT IN AN ORGANIZED HEALTH CARE EDUCATION/TRAINING PROGRAM

## 2022-08-19 PROCEDURE — 99213 OFFICE O/P EST LOW 20 MIN: CPT | Mod: PBBFAC | Performed by: STUDENT IN AN ORGANIZED HEALTH CARE EDUCATION/TRAINING PROGRAM

## 2022-08-19 PROCEDURE — 3077F SYST BP >= 140 MM HG: CPT | Mod: CPTII,,, | Performed by: STUDENT IN AN ORGANIZED HEALTH CARE EDUCATION/TRAINING PROGRAM

## 2022-08-19 PROCEDURE — 99205 OFFICE O/P NEW HI 60 MIN: CPT | Mod: S$PBB,,, | Performed by: STUDENT IN AN ORGANIZED HEALTH CARE EDUCATION/TRAINING PROGRAM

## 2022-08-19 PROCEDURE — 1159F MED LIST DOCD IN RCRD: CPT | Mod: CPTII,,, | Performed by: STUDENT IN AN ORGANIZED HEALTH CARE EDUCATION/TRAINING PROGRAM

## 2022-08-19 RX ORDER — MIRTAZAPINE 15 MG/1
15 TABLET, FILM COATED ORAL NIGHTLY
Qty: 30 TABLET | Refills: 0 | Status: SHIPPED | OUTPATIENT
Start: 2022-08-19 | End: 2022-09-18

## 2022-08-19 RX ORDER — MORPHINE SULFATE 15 MG/1
15 TABLET, FILM COATED, EXTENDED RELEASE ORAL 2 TIMES DAILY
Qty: 60 TABLET | Refills: 0 | Status: SHIPPED | OUTPATIENT
Start: 2022-08-19 | End: 2022-10-05 | Stop reason: SDUPTHER

## 2022-08-19 RX ORDER — OXYCODONE HYDROCHLORIDE 5 MG/1
5 TABLET ORAL EVERY 4 HOURS PRN
Qty: 180 TABLET | Refills: 0 | Status: SHIPPED | OUTPATIENT
Start: 2022-08-19 | End: 2022-10-05 | Stop reason: SDUPTHER

## 2022-08-19 RX ORDER — NALOXONE HYDROCHLORIDE 4 MG/.1ML
1 SPRAY NASAL
Qty: 1 EACH | Refills: 2 | Status: SHIPPED | OUTPATIENT
Start: 2022-08-19

## 2022-08-19 NOTE — PROGRESS NOTES
[place]- Pall[facility]  Palliative Care   Psychosocial Assessment    Patient Name: Kwame Franco  MRN: [id number]  Palliative Care Provider: Dinora Cormier MD   Primary Care Physician: Primary Doctor No  Principal Problem: Primary pancreatic neuroendocrine tumor     Reason for Referral: Advance Care Planning and Psychosocial Support       Present during Interview: patient.      Primary Language:English   Needed: no      Past Medical Situation:   PMH:   Past Medical History:   Diagnosis Date    Primary pancreatic neuroendocrine tumor     VHL (von Hippel-Lindau syndrome)      Mental Health/Substance Use History: Patient exhibits depression and anxiety   Risk of Abuse, neglect or exploitation: None identified   Current or Previous Trauma and/or evidence of PTSD: None identified   Non-traditional Health practices: None identified     Understanding of diagnosis and prognosis: Fair   Experience/Comfort level with health care system: Fair     Patients Mental Status:  Alert and oriented to person, place, time and situation with depressed mood     Socio-Economic Factors/Resources:  Address: [address] [place] [address]  Phone Number: [phone] (home)     Marital Status: Engaged   Household composition: Patient, fiance, and five of his six children   Children:  Six children (ages 13, 11, 10, 6, 4, and 3)    Patient/Family perceptions about Caregiving Needs; availability and capacity:  Patient independent with ADLs.  Patient reports having good support from his fiance, sisters and nephew. It is unknown the extent of care these parties can provide as needs advance.     Family Dynamics/Relationships: Healthy     Patient/Family Strengths/Resilience: Patient is willing to engage in discussion of difficult topics with team.   Patient/Family Coping: Patient would benefit from additional support in processing his emotions and establishing reachable goals.     Activities  "of Daily Living: Independent   Support Systems-Family & Community (Home Health, HME etc):  N/a     Transportation:  yes    Work/Education History: unemployed  Self-Care Activities/Hobbies: Spending time with children, taking walks      History: no    Financial Resources:Medicaid      Advanced Care Planning & Legal Concerns:   Advanced Directives/Living Will: no   [place]: no   Planning:  no    Power of : yes  Surrogate Decision Maker: Dwikish "Russell" Minor/Fiance       Spirituality, Culture & Coping Mechanisms:  F- [place] and Belief: No Samaritan     I - Importance:  Patient reports he his doing "well" spiritually     C - Community/Culture Values: not discussed      A - Address in Care: Spiritual needs met at this time       Goals/Hopes/Expectations: Patient hopes to receive a transplant.  Fears/Anxiety/Concerns: Patient admits he is afraid he will not see his children grow up.         Complicated Bereavement Risk Assessment Tool (CBRAT)  Reference:  [facility] Clinical Practice Group (May 2016). Bereavement Risk Screening and Management Guidelines.  Retrieved from: http://www.grpcc.com.au/wp-content/uploads//KHRHF-Tboguflbfgg-Pzlpqwfpb-and-Management-Guideline-2016.pdf      Bereaved Client Characteristics   ? Under 18      yes  ? Was a Twin   no  ? Young Spouse   yes  ? Elderly Spouse    no  ? Isolated    no  ? Lacks Meaningful Social Support   no  ? Dissatisfied with help available during illness   no  ? New to [employer] [place] no  ? New to Decision-Making   no    Illness  ? Inherited Disorder   no  ? Stigmatized Disease in the family/community   no  ? Lengthy/Burdensome   no     Bereaved Client's History of Loss   ? Cumulative Multiple Losses   no  ? Previous Mental Health Illnesses   no  ? Current Mental Health Illness   no  ? Other Significant Health Issues   no   ? Migrant/Refugee   no Death  ? Sudden or Unexpected   " "no  ? Traumatic Circumstances Associated with Death   no  ? Significant Cultural/Social Burdens as a result of Death   yes   Relationship with   ? Profound Lifelong Partner   no  ? Highly Dependent    yes  ? Antagonistic   no  ? Ambivalent   no  ? Deeply Connected   yes  ? Culturally Defined   yes   Risk Factors Scores  0-2  Low  3-5  Moderate  5+  High  All persons scoring moderate to high presume to be at risk**    (** It is acknowledged that protective factors and resilience may outweigh apparent risk factors.      Total Risk Factors Score:   Moderate to high.  Patient's family at risk for complicated grief due to patient's young age and the ages of his six children. Family would benefit from additional support.       SW accompanied MD during initial visit with patient.  Patient presents Oriented x4 with depressed and anxious mood. Patient appears to have a fair understanding of his illness. Team provided patient with a safe environment to express his fears and feelings of sadness. Patient acknowledges a fear he will miss watching his children (ranging in ages from 13 to 3) grow up.  Patient reports his physical symptoms effect his ability to fully engage in activities with his children in the extent he wishes.  SW validated these concerns and collaborated with patient regarding adjustments to behaviors to honor the limitations of his body while continuing to make meaningful moments with family. SW assessed patient's present coping mechanisms.  Patient reports he isolates himself and uses distractions when experiencing moments of distress.  Patient adds he does not reach out to others often for fear of burdening them with his problems. SW gauged patient's interest in psychotherapy.  Patient reports he feels "weird" talking to others about his concerns.  SW honored this concern and informed patient of this team's availability to provide this service if he wishes to take advantage.  Patient verbalized " understanding. Patient reports having good support from his fiance/Ananya, three sisters, and nephew.  Patient plans to move to [place], [place] in mid October.  This team to continue providing support prior to patient's relocation.  Patient denies further psychosocial concerns. SW remains available to provide assistance as needed. SW will continue to follow.    Betsy Young LCSW  Outpatient   Palliative Medicine
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
## 2022-09-01 ENCOUNTER — TELEPHONE (OUTPATIENT)
Dept: HEMATOLOGY/ONCOLOGY | Facility: CLINIC | Age: 32
End: 2022-09-01
Payer: MEDICAID

## 2022-09-02 ENCOUNTER — INFUSION (OUTPATIENT)
Dept: INFUSION THERAPY | Facility: HOSPITAL | Age: 32
End: 2022-09-02
Attending: INTERNAL MEDICINE
Payer: MEDICAID

## 2022-09-02 VITALS — HEIGHT: 72 IN | WEIGHT: 150.81 LBS | BODY MASS INDEX: 20.43 KG/M2

## 2022-09-02 DIAGNOSIS — D3A.8 PRIMARY PANCREATIC NEUROENDOCRINE TUMOR: Primary | ICD-10-CM

## 2022-09-02 PROCEDURE — 63600175 PHARM REV CODE 636 W HCPCS: Mod: JG | Performed by: INTERNAL MEDICINE

## 2022-09-02 PROCEDURE — 96372 THER/PROPH/DIAG INJ SC/IM: CPT

## 2022-09-02 RX ADMIN — OCTREOTIDE ACETATE 30 MG: KIT at 10:09

## 2022-09-15 ENCOUNTER — HOSPITAL ENCOUNTER (OUTPATIENT)
Dept: RADIOLOGY | Facility: HOSPITAL | Age: 32
Discharge: HOME | End: 2022-09-15
Attending: INTERNAL MEDICINE
Payer: MEDICAID

## 2022-09-15 DIAGNOSIS — D3A.8 NEUROENDOCRINE TUMOR OF PANCREAS: ICD-10-CM

## 2022-09-15 PROCEDURE — 78815 NM PET CU64 DOTATATE, SKULL TO MID THIGH: ICD-10-PCS | Mod: 26,PI,, | Performed by: RADIOLOGY

## 2022-09-15 PROCEDURE — 78815 PET IMAGE W/CT SKULL-THIGH: CPT | Mod: TC

## 2022-09-15 PROCEDURE — 78815 PET IMAGE W/CT SKULL-THIGH: CPT | Mod: 26,PI,, | Performed by: RADIOLOGY

## 2022-09-19 ENCOUNTER — TELEPHONE (OUTPATIENT)
Dept: PALLIATIVE MEDICINE | Facility: CLINIC | Age: 32
End: 2022-09-19
Payer: MEDICAID

## 2022-09-19 ENCOUNTER — HOSPITAL ENCOUNTER (EMERGENCY)
Facility: OTHER | Age: 32
Discharge: HOME | End: 2022-09-20
Attending: EMERGENCY MEDICINE
Payer: MEDICAID

## 2022-09-19 DIAGNOSIS — M54.6 ACUTE LEFT-SIDED THORACIC BACK PAIN: ICD-10-CM

## 2022-09-19 DIAGNOSIS — M25.572 ACUTE LEFT ANKLE PAIN: ICD-10-CM
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
## 2022-09-19 DIAGNOSIS — R07.9 LEFT-SIDED CHEST PAIN: ICD-10-CM

## 2022-09-19 PROCEDURE — 99283 EMERGENCY DEPT VISIT LOW MDM: CPT | Mod: 25

## 2022-09-20 VITALS
OXYGEN SATURATION: 98 % | TEMPERATURE: 98 F | HEIGHT: 73 IN | HEART RATE: 60 BPM | WEIGHT: 153 LBS | DIASTOLIC BLOOD PRESSURE: 76 MMHG | SYSTOLIC BLOOD PRESSURE: 136 MMHG | BODY MASS INDEX: 20.28 KG/M2 | RESPIRATION RATE: 16 BRPM

## 2022-09-20 PROCEDURE — 25000003 PHARM REV CODE 250: Performed by: EMERGENCY MEDICINE

## 2022-09-20 RX ORDER — KETOROLAC TROMETHAMINE 10 MG/1
10 TABLET, FILM COATED ORAL
Status: DISCONTINUED | OUTPATIENT
Start: 2022-09-20 | End: 2022-09-20

## 2022-09-20 RX ORDER — METHOCARBAMOL 750 MG/1
1500 TABLET, FILM COATED ORAL
Status: DISCONTINUED | OUTPATIENT
Start: 2022-09-20 | End: 2022-09-20 | Stop reason: HOSPADM

## 2022-09-20 RX ORDER — ORPHENADRINE CITRATE 30 MG/ML
60 INJECTION INTRAMUSCULAR; INTRAVENOUS
Status: DISCONTINUED | OUTPATIENT
Start: 2022-09-20 | End: 2022-09-20

## 2022-09-20 RX ORDER — OXYCODONE HYDROCHLORIDE 5 MG/1
5 TABLET ORAL
Status: COMPLETED | OUTPATIENT
Start: 2022-09-20 | End: 2022-09-20

## 2022-09-20 RX ORDER — OXYCODONE HYDROCHLORIDE 5 MG/1
5 TABLET ORAL
Status: DISCONTINUED | OUTPATIENT
Start: 2022-09-20 | End: 2022-09-20 | Stop reason: HOSPADM

## 2022-09-20 RX ORDER — METHOCARBAMOL 750 MG/1
1500 TABLET, FILM COATED ORAL
Status: COMPLETED | OUTPATIENT
Start: 2022-09-20 | End: 2022-09-20

## 2022-09-20 RX ADMIN — OXYCODONE 5 MG: 5 TABLET ORAL at 01:09

## 2022-09-20 RX ADMIN — METHOCARBAMOL 1500 MG: 750 TABLET ORAL at 01:09
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
## 2022-09-21 ENCOUNTER — PATIENT MESSAGE (OUTPATIENT)
Dept: PALLIATIVE MEDICINE | Facility: CLINIC | Age: 32
End: 2022-09-21
Payer: MEDICAID

## 2022-09-30 ENCOUNTER — INFUSION (OUTPATIENT)
Dept: INFUSION THERAPY | Facility: HOSPITAL | Age: 32
End: 2022-09-30
Attending: INTERNAL MEDICINE
Payer: MEDICAID

## 2022-09-30 VITALS — BODY MASS INDEX: 20.28 KG/M2 | HEIGHT: 73 IN | WEIGHT: 153 LBS

## 2022-09-30 DIAGNOSIS — D3A.8 PRIMARY PANCREATIC NEUROENDOCRINE TUMOR: Primary | ICD-10-CM

## 2022-09-30 PROCEDURE — 96372 THER/PROPH/DIAG INJ SC/IM: CPT

## 2022-09-30 PROCEDURE — 63600175 PHARM REV CODE 636 W HCPCS: Mod: JG | Performed by: INTERNAL MEDICINE

## 2022-09-30 RX ADMIN — OCTREOTIDE ACETATE 30 MG: KIT at 10:09

## 2022-09-30 NOTE — NURSING
Pt tolerated Lanreotide injection well, see flowsheet for assessment details. No adverse reactions noted. Patient reports is moving to [place] and will receive care there (Dr. Arellano is referring to provider in [place]). Pt d/c in no acute distress.

## 2022-10-04 ENCOUNTER — TELEPHONE (OUTPATIENT)
Dept: PALLIATIVE MEDICINE | Facility: CLINIC | Age: 32
End: 2022-10-04
Payer: MEDICAID

## 2022-10-04 NOTE — PROGRESS NOTES
"Consult Note  Palliative Care      Consult Requested By: Adan  Reason for Consult: symptom management and ACP      ASSESSMENT/PLAN:     Plan/Recommendations:  Diagnoses and all orders for this visit:    Neuroendocrine tumor of pancreas/Secondary malignant neoplasm of liver  - patient followed by Dr Arellano  - Now moving to [place], will no longer continue care with [facility]     Encounter for palliative care/Advanced care planning  - patient decisional  - patient presents virtually alone   - some ACP documents completed and uploaded into EMR  - HCPOA: Declan [place]  - no living will uploaded into EMR  - philosophy of Palliative Medicine reviewed with patient today  - new patient folder given to and reviewed with patient today  - goals: life prolonging, obtain transplant  - did not discuss living will or code status today  - GOC and follow-up with future palliative provider     Cancer-related pain  - patient reports moderate to severe pain in his abdomen near his surgical site   - he also reports muscle spasm pain in his left flank as well as left shoulder. Left shoulder pain usually occurs with eating  - currently on MS Contin 15 mg BID and oxycodone 5 mg q4h prn  - opioid safety reviewed at first visit   - narcan prescribed today  - continue baclofen 10 mg TID prn for muscle spasms  - opioid safety sheet in new patient folder for patient to review  - final refills provided today   - instructed to follow-up with future oncologist/pall med provider     Adjustment disorder with mixed anxiety and depressed mood  - patient previously reported "some" anxiety and "a little" depression  - patient previously stated that he is mostly worried about his children and not being there to watch them grow up  - emotional support provided   - continue mirtazapine 15 mg qhs for anxiety, sleep, and appetite  - instructed to follow-up with future oncologist/pall med provider     Dyspnea/Neoplastic (malignant) related fatigue  - patient reporting " increased dyspnea and fatigue with activities such as going to the aquarium with his kids  - symptom cluster due to malignancy and treatment  - tip sheet for shortness of breath in new patient folder for patient to review  - discussed finding a balance between rest and activity  - continue nebulizer as previously prescribed  - no need for pharmacologic intervention  - instructed to follow-up with future oncologist/pall med provider     Anorexia  - patient reporting he has poor appetite and then pain worsens with eating, which further reduces his appetite   - will work on improved pain control  - will ask nutrition about non-dairy based protein drinks  - will refer to oncology nutrition  - will hold on pharmacologic intervention at this time  - instructed to follow-up with future oncologist/pall med provider     Insomnia  - patient with poor sleeping, as he is only able to sleep about 2-3 hours a night  - he states that he is worried about his children and his thoughts can be overwhelming  - tip sheet for better sleep in new patient folder for patient to review  - continue mirtazapine 15 mg qhs for sleep, appetite, and mood  - start trazodone 50mg qhs PRN   - instructed to follow-up with future oncologist/pall med provider     Understanding of illness/Prognosis: patient has fair understanding of illness; prognosis is guarded    Goals of care: life prolonging    Follow up: none, moving to [place] with referral to new oncology team     Patient's encounter and above plan of care discussed with patient's oncologist    SUBJECTIVE:     History of Present Illness:  Patient is a 31 y.o. year old male with von Hippel-Lindau syndrome (VHL) and neuroendocrine of pancreas with known liver mets presents to Palliative Medicine for symptom management and ACP. Please see oncology notes for full details of oncologic history and treatment course.     10/05/2022:  KELLI ISBELL reviewed and summarized:  08/19/2022 Morphine Sulf Er 15 mg tablets  Disp: 60 for 30 days  08/19/2022 Oxycodone Hcl (Ir) 5 mg tablets Disp: 180 for 30 days    Patient presents alone for virtual visit. He reports pain regimen provides good pain control. Additionally, his appetite has improved and he is eating larger servings of food. He continues to have some insomnia, a prescription for trazodone was provided. He denies any other concerns. Because the patient is moving out of state next week, this is his last visit with the [facility] clinic. He requests a final visit with Dr. Arellano, informed her clinic, final virtual is scheduled for 10/06.     08/17/2022:  KELLI ISBELL reviewed and summarized:  07/07/2022 Oxycodone 5 mg Disp: 120 for 30 days    Patient presents today by himself. He has moderate to severe pain in his abdomen and his left shoulder with eating. His medication provides some relief, but he sometimes needs about 4-6 doses for pain control. Other times he can use only two doses. Patient having increased dyspnea and fatigue with exertion. He feels some anxiety/depression, especially when he thinks about his children growing up without him. Patient not eating much due to poor appetite and increased pain with eating. He only sleeps a few hours a night due to racing thoughts. He has already completed [employer]. He is moving up to [place] in mid October.     Past Medical History: VHL and PNET    Past Surgical History: distal pancreatectomy, splenectomy, left adrenalectomy, RP lymph node dissection, liver biopsy, and cholecystectomy in March 2022    Family History: no previous family members with neuroendocrine tumor of pancreas    Review of patient's allergies indicates:   Allergen Reactions    Latex, natural rubber     Shellfish containing products     Latex, natural rubber Rash       Medications:    Current Outpatient Medications:     albuterol (ACCUNEB) 0.63 mg/3 mL Nebu, Take 0.63 mg by nebulization every 6 (six) hours as needed. Rescue, Disp: , Rfl:     baclofen (LIORESAL) 10 MG tablet, Take  1 tablet (10 mg total) by mouth 3 (three) times daily as needed (muscle spasm)., Disp: 90 tablet, Rfl: 2    blood sugar diagnostic Strp, 1 strip by Misc.(Non-Drug; Combo Route) route once daily., Disp: 100 strip, Rfl: 3    blood-glucose meter kit, Use as instructed, Disp: 1 each, Rfl: 0    cetirizine (ZYRTEC) 10 MG tablet, Take 10 mg by mouth once daily., Disp: , Rfl:     ibuprofen (ADVIL,MOTRIN) 800 MG tablet, Take 1 tablet (800 mg total) by mouth every 6 (six) hours as needed for Pain., Disp: 20 tablet, Rfl: 0    lancets (ACCU-CHEK MULTICLIX LANCET) Misc, 1 applicator by Misc.(Non-Drug; Combo Route) route once daily., Disp: 100 each, Rfl: 3    methocarbamoL (ROBAXIN) 500 MG Tab, Take by mouth 4 (four) times daily., Disp: , Rfl:     mirtazapine (REMERON) 15 MG tablet, Take 1 tablet (15 mg total) by mouth every evening., Disp: 30 tablet, Rfl: 0    morphine (MS CONTIN) 15 MG 12 hr tablet, Take 1 tablet (15 mg total) by mouth 2 (two) times daily., Disp: 60 tablet, Rfl: 0    naloxone (NARCAN) 4 mg/actuation Spry, 1 spray (4 mg total) by Nasal route as needed (accidental overdose)., Disp: 1 each, Rfl: 2    oxyCODONE (ROXICODONE) 5 MG immediate release tablet, Take 1 tablet (5 mg total) by mouth every 4 (four) hours as needed for Pain., Disp: 180 tablet, Rfl: 0    OBJECTIVE:       ROS:  Review of Systems   Constitutional:  Positive for activity change, appetite change, fatigue and unexpected weight change.   HENT: Negative.     Eyes: Negative.    Respiratory:  Positive for shortness of breath.    Cardiovascular: Negative.    Gastrointestinal:  Positive for abdominal pain. Negative for constipation, diarrhea and nausea.   Genitourinary: Negative.    Musculoskeletal:  Positive for myalgias.   Skin: Negative.    Neurological: Negative.    Psychiatric/Behavioral:  Positive for dysphoric mood and sleep disturbance. The patient is nervous/anxious.    All other systems reviewed and are negative.    Review of  Symptoms      Symptom Assessment (ESAS 0-10 Scale)  Pain:  3  Dyspnea:  2  Anxiety:  4  Nausea:  0  Depression:  2  Anorexia:  6  Fatigue:  4  Insomnia:  7  Restlessness:  0  Agitation:  0     CAM / Delirium:  Negative  Constipation:  Negative  Diarrhea:  Negative    Anxiety:  Is nervous/anxious  Constipation:  No constipation    Bowel Management Plan (BMP):  No      Pain Assessment:  OME in 24 hours:  10-30  Location(s): abdomen    Abdomen       Location: left        Quality: Aching, pressure-like and sharp        Quantity: 7/10 in intensity        Chronicity: Onset 3 month(s) ago, unchanged        Aggravating Factors: Eating        Alleviating Factors: Opiates        Associated Symptoms: Anorexia    Modified Adelita Scale:  0.5    ECOG Performance Status ndGndrndanddndend:nd nd2nd Living Arrangements:  Lives with family    Psychosocial/Cultural: Patient has 6 children (13, 11, 10, 6, 4, and 3)    Spiritual:  F - Esperanza and Belief:  Spiritual  I - Importance:  Doing well          Physical Exam:  Vitals:  Virtual visit, no vital obtained   Physical Exam  Vitals reviewed.   Constitutional:       General: He is not in acute distress.     Appearance: He is not toxic-appearing.   HENT:      Head: Normocephalic and atraumatic.      Right Ear: External ear normal.      Left Ear: External ear normal.   Eyes:      General: No scleral icterus.        Right eye: No discharge.         Left eye: No discharge.   Neck:      Comments: Trachea midline  Pulmonary:      Effort: Pulmonary effort is normal. No respiratory distress.   Abdominal:      General: Abdomen is flat. There is no distension.   Musculoskeletal:         General: No swelling. Normal range of motion.      Cervical back: Normal range of motion.      Right lower leg: No edema.      Left lower leg: No edema.   Skin:     General: Skin is dry.      Coloration: Skin is not jaundiced.      Findings: No rash.   Neurological:      Mental Status: He is alert and oriented to person, place, and  "time.      Gait: Gait normal.   Psychiatric:         Mood and Affect: Mood is depressed.         Behavior: Behavior normal.         Thought Content: Thought content normal.         Judgment: Judgment normal.       Labs:  CBC:   WBC   Date Value Ref Range Status   09/02/2022 7.38 3.90 - 12.70 K/uL Final     Hemoglobin   Date Value Ref Range Status   09/02/2022 13.4 (L) 14.0 - 18.0 g/dL Final     Hematocrit   Date Value Ref Range Status   09/02/2022 41.5 40.0 - 54.0 % Final     MCV   Date Value Ref Range Status   09/02/2022 83 82 - 98 fL Final     Platelets   Date Value Ref Range Status   09/02/2022 422 150 - 450 K/uL Final       LFT:   Lab Results   Component Value Date    AST 46 (H) 09/02/2022    ALKPHOS 350 (H) 09/02/2022    BILITOT 0.4 09/02/2022       Albumin:   Albumin   Date Value Ref Range Status   09/02/2022 4.6 3.5 - 5.2 g/dL Final     Protein:   Total Protein   Date Value Ref Range Status   09/02/2022 8.4 6.0 - 8.4 g/dL Final       Radiology:I have reviewed all pertinent imaging results/findings within the past 24 hours.    09/15/2022 NM PET "Impression:     In this patient with metastatic pancreatic neuroendocrine tumor there is no focal uptake along the suture line within the pancreas or near the surgical clips in the region of the previous left adrenal gland suggest local disease recurrence.  There are multiple radiotracer avid hypodense metastatic lesions throughout the hepatic parenchyma.  Overall the scan appears significantly improved compared with the earlier outside DOTATATE PET/CT.     Nonspecific faint uptake in the bilateral axillary lymph nodes likely reactive, attention on follow-up."    [facility] "Unchanged appearance of metastatic hepatic tumor burden with target lesions as above. Interval development of a large volume of ascites. Stable 3-4 cm perianastomotic collection along the pancreatic resection margin. "    38 minutes of total time spent on the " encounter, which includes face to face time and non-face to face time preparing to see the patient (eg, review of tests), Obtaining and/or reviewing separately obtained history, Documenting clinical information in the electronic or other health record, Independently interpreting results (not separately reported) and communicating results to the patient/family/caregiver, or Care coordination (not separately reported).    Signature: yLnn Aquino DNP

## 2022-10-05 ENCOUNTER — TELEPHONE (OUTPATIENT)
Dept: INFUSION THERAPY | Facility: HOSPITAL | Age: 32
End: 2022-10-05
Payer: MEDICAID

## 2022-10-05 ENCOUNTER — OFFICE VISIT (OUTPATIENT)
Dept: PALLIATIVE MEDICINE | Facility: CLINIC | Age: 32
End: 2022-10-05
Payer: MEDICAID

## 2022-10-05 DIAGNOSIS — G89.3 CANCER-RELATED PAIN: ICD-10-CM

## 2022-10-05 DIAGNOSIS — F43.23 ADJUSTMENT DISORDER WITH MIXED ANXIETY AND DEPRESSED MOOD: ICD-10-CM

## 2022-10-05 DIAGNOSIS — R06.00 DYSPNEA, UNSPECIFIED TYPE: ICD-10-CM

## 2022-10-05 DIAGNOSIS — G47.00 INSOMNIA, UNSPECIFIED TYPE: ICD-10-CM

## 2022-10-05 DIAGNOSIS — R53.0 NEOPLASTIC (MALIGNANT) RELATED FATIGUE: ICD-10-CM

## 2022-10-05 DIAGNOSIS — D3A.8 NEUROENDOCRINE TUMOR OF PANCREAS: ICD-10-CM

## 2022-10-05 DIAGNOSIS — Z51.5 ENCOUNTER FOR PALLIATIVE CARE: Primary | ICD-10-CM

## 2022-10-05 DIAGNOSIS — R63.0 ANOREXIA: ICD-10-CM

## 2022-10-05 PROCEDURE — 99214 OFFICE O/P EST MOD 30 MIN: CPT | Mod: 95,,, | Performed by: NURSE PRACTITIONER

## 2022-10-05 PROCEDURE — 99214 PR OFFICE/OUTPT VISIT, EST, LEVL IV, 30-39 MIN: ICD-10-PCS | Mod: 95,,, | Performed by: NURSE PRACTITIONER

## 2022-10-05 RX ORDER — TRAZODONE HYDROCHLORIDE 50 MG/1
50 TABLET ORAL NIGHTLY
Qty: 30 TABLET | Refills: 0 | Status: SHIPPED | OUTPATIENT
Start: 2022-10-05 | End: 2022-11-04

## 2022-10-05 RX ORDER — MORPHINE SULFATE 15 MG/1
15 TABLET, FILM COATED, EXTENDED RELEASE ORAL 2 TIMES DAILY
Qty: 60 TABLET | Refills: 0 | Status: SHIPPED | OUTPATIENT
Start: 2022-10-05

## 2022-10-05 RX ORDER — OXYCODONE HYDROCHLORIDE 5 MG/1
5 TABLET ORAL EVERY 4 HOURS PRN
Qty: 180 TABLET | Refills: 0 | Status: SHIPPED | OUTPATIENT
Start: 2022-10-05

## 2022-10-06 ENCOUNTER — OFFICE VISIT (OUTPATIENT)
Dept: HEMATOLOGY/ONCOLOGY | Facility: CLINIC | Age: 32
End: 2022-10-06
Payer: MEDICAID

## 2022-10-06 ENCOUNTER — TELEPHONE (OUTPATIENT)
Dept: HEMATOLOGY/ONCOLOGY | Facility: CLINIC | Age: 32
End: 2022-10-06
Payer: MEDICAID

## 2022-10-06 VITALS
OXYGEN SATURATION: 97 % | DIASTOLIC BLOOD PRESSURE: 97 MMHG | WEIGHT: 162.5 LBS | HEART RATE: 74 BPM | TEMPERATURE: 98 F | SYSTOLIC BLOOD PRESSURE: 151 MMHG | BODY MASS INDEX: 21.44 KG/M2

## 2022-10-06 DIAGNOSIS — D84.81 IMMUNODEFICIENCY SECONDARY TO NEOPLASM: ICD-10-CM

## 2022-10-06 DIAGNOSIS — G89.3 CANCER RELATED PAIN: ICD-10-CM

## 2022-10-06 DIAGNOSIS — D49.9 IMMUNODEFICIENCY SECONDARY TO NEOPLASM: ICD-10-CM

## 2022-10-06 DIAGNOSIS — D3A.8 PRIMARY PANCREATIC NEUROENDOCRINE TUMOR: Primary | ICD-10-CM

## 2022-10-06 DIAGNOSIS — C78.7 SECONDARY MALIGNANT NEOPLASM OF LIVER: ICD-10-CM

## 2022-10-06 DIAGNOSIS — Q85.83 VHL (VON HIPPEL-LINDAU SYNDROME): ICD-10-CM

## 2022-10-06 PROCEDURE — 99999 PR PBB SHADOW E&M-EST. PATIENT-LVL III: ICD-10-PCS | Mod: PBBFAC,,, | Performed by: INTERNAL MEDICINE

## 2022-10-06 PROCEDURE — 3077F SYST BP >= 140 MM HG: CPT | Mod: CPTII,,, | Performed by: INTERNAL MEDICINE

## 2022-10-06 PROCEDURE — 1159F PR MEDICATION LIST DOCUMENTED IN MEDICAL RECORD: ICD-10-PCS | Mod: CPTII,,, | Performed by: INTERNAL MEDICINE

## 2022-10-06 PROCEDURE — 1160F RVW MEDS BY RX/DR IN RCRD: CPT | Mod: CPTII,,, | Performed by: INTERNAL MEDICINE

## 2022-10-06 PROCEDURE — 99215 OFFICE O/P EST HI 40 MIN: CPT | Mod: S$PBB,,, | Performed by: INTERNAL MEDICINE

## 2022-10-06 PROCEDURE — 1159F MED LIST DOCD IN RCRD: CPT | Mod: CPTII,,, | Performed by: INTERNAL MEDICINE

## 2022-10-06 PROCEDURE — 1160F PR REVIEW ALL MEDS BY PRESCRIBER/CLIN PHARMACIST DOCUMENTED: ICD-10-PCS | Mod: CPTII,,, | Performed by: INTERNAL MEDICINE

## 2022-10-06 PROCEDURE — 3080F DIAST BP >= 90 MM HG: CPT | Mod: CPTII,,, | Performed by: INTERNAL MEDICINE

## 2022-10-06 PROCEDURE — 99215 PR OFFICE/OUTPT VISIT, EST, LEVL V, 40-54 MIN: ICD-10-PCS | Mod: S$PBB,,, | Performed by: INTERNAL MEDICINE

## 2022-10-06 PROCEDURE — 3008F BODY MASS INDEX DOCD: CPT | Mod: CPTII,,, | Performed by: INTERNAL MEDICINE

## 2022-10-06 PROCEDURE — 3077F PR MOST RECENT SYSTOLIC BLOOD PRESSURE >= 140 MM HG: ICD-10-PCS | Mod: CPTII,,, | Performed by: INTERNAL MEDICINE

## 2022-10-06 PROCEDURE — 3080F PR MOST RECENT DIASTOLIC BLOOD PRESSURE >= 90 MM HG: ICD-10-PCS | Mod: CPTII,,, | Performed by: INTERNAL MEDICINE

## 2022-10-06 PROCEDURE — 99999 PR PBB SHADOW E&M-EST. PATIENT-LVL III: CPT | Mod: PBBFAC,,, | Performed by: INTERNAL MEDICINE

## 2022-10-06 PROCEDURE — 3008F PR BODY MASS INDEX (BMI) DOCUMENTED: ICD-10-PCS | Mod: CPTII,,, | Performed by: INTERNAL MEDICINE

## 2022-10-06 PROCEDURE — 99213 OFFICE O/P EST LOW 20 MIN: CPT | Mod: PBBFAC,PO | Performed by: INTERNAL MEDICINE

## 2022-10-06 NOTE — PROGRESS NOTES
PROGRESS NOTE    Subjective:       Patient ID: Kwame Franco is a 31 y.o. male.    Chief Complaint: follow up for metastatic PNET    Diagnosis:  Metastatic Pancreatic neuroendocrine tumor. Grade 2 Well differentiated. Mitoses <2; Ki-67 3%; pT4, N0, M1c    Oncologic History:  1. Mr. Franco is a 31M with VHL and metastatic PNET who initially saw me on 7/7/2022 for further management. He is s/p distal pancreatectomy, splenectomy, left adrenalectomy, RP LN dissection, liver biopsy and cholecystectomy on 3/29/2022 by Dr [place] [facility]. He was readmitted on 4/18/22 with MSSA bacteremia and discharged with home infusion of cefazolin to complete a 6 week course. He is on sandostatin monthly injections. Was previously receiving care at [facility] but wants to transfer care to us. Due for repeat imaging in July and MRI brain/ear and spine as part of the screening protocol for VHL.      Patient initially presented with abdominal pain, diarrhea and flushing since March 2021.     CT A/P 2/7/22:  Innumerable peripherally enhancing hypoattenuating lesions throughout the liver, new from the CT abdomen April 12, 2016. Additional heterogeneous mass in the left upper quadrant appears to arise from the pancreatic tail, which may be the primary source. There is loss of differentiation with the ipsilateral adrenal gland and loss of fat plane with the anterior upper pole of the left kidney. A separate 1.3 cm hyperenhancing focus is noted in the pancreatic head. Small amount of ascites present.      Biopsy of the pancreas 2/9/22:  Cytomorphology and immunochemistry is that of a pancreatic neuroendocrine tumor, well differentiated in this material and associated with fibrous tissue fragments. See note.        Note: Tumor cells are positive for CAM5.2 and diffusely for synaptophysin with appropriate controls. Ki-67 is pending for completion, and an addendum will follow; however, the  "liver findings on imaging indicative of probable metastatic spread are noted. Recommend clinical correlation.      He was started on sandostatin LAR 30 mg at the end of Feb 2022 which significantly helped with his symptoms.      3/8/22: chromogranin A 32.5, 5-HIAA 10    Copper PET 3/8/22:  "The liver is enlarged with innumerable ill-defined hypoattenuating lesions seen throughout, the largest of which measures up to 4.6 cm and demonstrate increased radiotracer uptake. These lesions are compatible with metastatic disease and demonstrate a Krenning score of 4.     There is a loss of fat plane at the anterior left kidney upper pole with an ill-defined heterogeneous mass that demonstrates radiotracer uptake in the area of the left adrenal gland/pancreatic tail. These findings are compatible with fine-needle aspiration findings of pancreatic neuroendocrine tumor with likely metastatic disease to the left adrenal gland; Krenning score of 4.     The bilateral axillary lymph nodes are not enlarged by size criteria but demonstrate slight uptake of radiotracer, although less than that of the liver; Krenning score of 1.     Interval development of ascites adjacent to the inferior aspect of the liver, in the right paracolic gutter and in the pelvis.     No abnormally enlarged or radioisotope avid mediastinal, hilar, intra-abdominal, intrapelvic or inguinal lymph nodes nodes are noted to suggest metastatic disease."      He underwent distal pancreatectomy, splenectomy, left adrenalectomy, RP LN dissection, liver biopsy and cholecystectomy by Dr Delatorre on 3/29/2022.     Surgical pathology 3/29/2022:  1. Right hepatic lobe, mass biopsy:  - Metastatic well differentiated neuroendocrine tumor.      2. Gallbladder, cholecystectomy:  - Polypoidal well-differentiated neuroendocrine tumor, 0.5 cm . Cystic duct margin and serosal surface negative for tumor.      3. Pancreas, spleen and left adrenal gland, partial pancreatectomy, " "splenectomy, and adrenalectomy:  - Well-differentiated neuroendocrine tumor, 5.8 cm, grade 2.  Ki 67 3%. Neoplasm extends through fibroconnective tissue into the adherent adrenal gland, it abuts the spleen without involvement.  Perineural invasion identified.  Anterior and posterior margins positive for tumor.  Pancreatic resection margin negative ( by 3.8 mm from the tumor).  Twelve lymph nodes, negative for metastasis. (0/12). Please see comment and synoptic report     4. Soft tissue, retroperitoneum, resection:  - Well-differentiated neuroendocrine tumor extending into fibroadipose tissue and adrenal gland     5. Left adrenal gland, remainder, resection:  - Benign residual adrenal gland, and fibroadipose tissue.     6. Left hepatic lobe, mass, biopsy:  - Metastatic well-differentiated neuroendocrine tumor        MRI A 22:  "Unchanged appearance of metastatic hepatic tumor burden with target lesions as above. Interval development of a large volume of ascites. Stable 3-4 cm perianastomotic collection along the pancreatic resection margin."     Patient presents to transfer his care. Was supposed to get sandostatin last week but didn't take it due to chaos at the infusion center. Was on oxycodone 4 a day after surgery. Main pain is in epigastric/LUQ with left back/shoulder muscle spasm from surgery. Also has pain in RUQ from cancer. Noted diarrhea, SOB, weakness. But overall slowly improving. Moving to [facility] in October.     Discussed continuing with Sandostatin every 4 weeks.     2. Copper PET 9/15/22 showed good response.     Interval History:   Mr Franco returns for follow up. He is feeling better. Seen by palliative medicine yesterday. Moving to [facility] next Monday.     ECO    ROS:   A ten-point system review is obtained and negative except for what was stated in the Interval History.     Physical Examination:   Vital signs reviewed.   General: well hydrated, well developed, in no " acute distress  HEENT: normocephalic, PERRLA, EOMI, anicteric sclerae, oropharynx clear  Neck: supple, no JVD, thyromegaly, cervical or supraclavicular lymphadenopathy  Lungs: clear breath sounds bilaterally, no wheezing, rales, or rhonchi  Heart: RRR, no M/R/G  Abdomen: soft, no tenderness, non-distended, no hepatosplenomegaly, mass, or hernia. BS present  Extremities: no clubbing, cyanosis, or edema  Skin: no rash, ulcer, or open wounds  Neuro: alert and oriented x 4, no focal neuro deficit  Psych: pleasant and appropriate mood and affect    Objective:     Laboratory Data:  Labs reviewed. Transaminitis improving    Imaging Data:  Copper PET 9/15/22:  In this patient with metastatic pancreatic neuroendocrine tumor there is no focal uptake along the suture line within the pancreas or near the surgical clips in the region of the previous left adrenal gland suggest local disease recurrence.  There are multiple radiotracer avid hypodense metastatic lesions throughout the hepatic parenchyma.  Overall the scan appears significantly improved compared with the earlier outside DOTATATE PET/CT.     Nonspecific faint uptake in the bilateral axillary lymph nodes likely reactive, attention on follow-up.       Assessment and Plan:     1. Primary pancreatic neuroendocrine tumor    2. Secondary malignant neoplasm of liver    3. Immunodeficiency secondary to neoplasm    4. VHL (von Hippel-Lindau syndrome)    5. Cancer related pain      1-3  - Mr Franco is an 32 yo man with metastatic well differentiated, grade 2 pancreatic NET, with metastases to liver, intra-abdominal soft tissue, gallbladder, s/p distal pancreatectomy, splenectomy, left adrenalectomy, RP LN dissection, liver biopsy and cholecystectomy on 3/29/2022 by Dr Delatorre at [facility]  - has been on sandostatin 30 mg q4w since the end of Feb 2022. Significant symptom improvement with sandostatin.   - reviewed copper PET scan results. Very good response to treatment. His disease  burden in the liver is also better.   - c/w sandostatin LAR 30 mg every 4 weeks  - moving to [place] next week. Asked our navigator to help with establishing care with local oncologist    4.  - seen by our genetics    5.  - better  - followed by palliative medicine  - monitor labs      Knows to call in the interval if any problems arise.    Electronically signed by Hansa Arellano    I spent 45 minutes reviewing the chart, interpreting laboratory result and imaging data, coordinating patient's care, with at least 50% of the time on face-to-face counseling.

## 2022-10-06 NOTE — TELEPHONE ENCOUNTER
Called patient and spoke to his wife. Explained that I have confirmed with [facility] that they accept medicaid insurance. Instructed wife to take down my number and call me back once he establishes with an insurance provider up in [place] and I can facilitate the referral to [facility]. She verbalized understanding, took down my name and number and stated she will call me back once he has his new insurance.

## 2022-10-27 ENCOUNTER — TELEPHONE (OUTPATIENT)
Dept: HEMATOLOGY/ONCOLOGY | Facility: CLINIC | Age: 32
End: 2022-10-27
Payer: MEDICAID

## 2022-10-27 DIAGNOSIS — D3A.8 NEUROENDOCRINE TUMOR: Primary | ICD-10-CM

## 2022-10-27 NOTE — TELEPHONE ENCOUNTER
"----- Message from Chelsy Delatorre sent at 10/27/2022  9:52 AM CDT -----  Regarding: speak with office  Contact: tamica  Consult/Advisory:           Name Of Caller: michael     Contact Preference?:[phone] (home)            Does patient feel the need to be seen today? No            What is the nature of the call?: calling to speak with nurse pollock            Additional Notes:  "Thank you for all that you do for our patients'"     "

## 2023-04-11 ENCOUNTER — PATIENT MESSAGE (OUTPATIENT)
Dept: RESEARCH | Facility: HOSPITAL | Age: 33
End: 2023-04-11
Payer: MEDICAID

## 2025-03-04 NOTE — TELEPHONE ENCOUNTER
----- Message from Hansa Arellano MD sent at 10/5/2022  3:59 PM CDT -----  Regarding: RE: Virtual appointment  9:30.   ----- Message -----  From: Marlin Phillips RN  Sent: 10/5/2022   3:55 PM CDT  To: Hansa Arellano MD  Subject: RE: Virtual appointment                          What time do you want me to put him? You are completely booked  ----- Message -----  From: Hanas Arellano MD  Sent: 10/5/2022   3:48 PM CDT  To: Marlin Phillips RN, Lynn Aquino DNP, #  Subject: RE: Virtual appointment                          He did not show for his appointment with me. Marlin, can you put him on for tomorrow and let him know?  ----- Message -----  From: Lynn Aquino DNP  Sent: 10/5/2022   2:58 PM CDT  To: Hansa Arellano MD, Andreia Stallworth PA-C  Subject: Virtual appointment                              Mr. Salvador Farris is moving to [place] early next week. He is hoping to have one more appointment with you, virtual if that's best, to wrap up and understand status of referral, etc.     Thanks much,   Lynn           
Confirmed tomorrow's appt to see Dr. Arellano at 9:30a in [place] [address]  
Wheelchair/Stroller